# Patient Record
Sex: FEMALE | Employment: FULL TIME | URBAN - METROPOLITAN AREA
[De-identification: names, ages, dates, MRNs, and addresses within clinical notes are randomized per-mention and may not be internally consistent; named-entity substitution may affect disease eponyms.]

---

## 2022-10-07 ENCOUNTER — HOSPITAL ENCOUNTER (INPATIENT)
Age: 32
LOS: 7 days | Discharge: HOME | DRG: 949 | End: 2022-10-14
Attending: PHYSICAL MEDICINE & REHABILITATION | Admitting: PHYSICAL MEDICINE & REHABILITATION
Payer: COMMERCIAL

## 2022-10-07 DIAGNOSIS — I60.9 SAH (SUBARACHNOID HEMORRHAGE) (HCC): Primary | ICD-10-CM

## 2022-10-07 LAB
ALBUMIN SERPL-MCNC: 4.2 G/DL (ref 3.4–5)
ANION GAP SERPL CALCULATED.3IONS-SCNC: 12 MMOL/L (ref 3–16)
BUN BLDV-MCNC: 7 MG/DL (ref 7–20)
CALCIUM SERPL-MCNC: 9.1 MG/DL (ref 8.3–10.6)
CHLORIDE BLD-SCNC: 97 MMOL/L (ref 99–110)
CO2: 25 MMOL/L (ref 21–32)
CREAT SERPL-MCNC: <0.5 MG/DL (ref 0.6–1.1)
GFR AFRICAN AMERICAN: >60
GFR NON-AFRICAN AMERICAN: >60
GLUCOSE BLD-MCNC: 102 MG/DL (ref 70–99)
GLUCOSE BLD-MCNC: 104 MG/DL (ref 70–99)
GLUCOSE BLD-MCNC: 104 MG/DL (ref 70–99)
GLUCOSE BLD-MCNC: 105 MG/DL (ref 70–99)
PERFORMED ON: ABNORMAL
PHOSPHORUS: 3 MG/DL (ref 2.5–4.9)
POTASSIUM SERPL-SCNC: 3.8 MMOL/L (ref 3.5–5.1)
SODIUM BLD-SCNC: 134 MMOL/L (ref 136–145)

## 2022-10-07 PROCEDURE — 80069 RENAL FUNCTION PANEL: CPT

## 2022-10-07 PROCEDURE — 6370000000 HC RX 637 (ALT 250 FOR IP): Performed by: PHYSICAL MEDICINE & REHABILITATION

## 2022-10-07 PROCEDURE — 1280000000 HC REHAB R&B

## 2022-10-07 PROCEDURE — 94150 VITAL CAPACITY TEST: CPT

## 2022-10-07 PROCEDURE — 94640 AIRWAY INHALATION TREATMENT: CPT

## 2022-10-07 PROCEDURE — 6360000002 HC RX W HCPCS: Performed by: PHYSICAL MEDICINE & REHABILITATION

## 2022-10-07 PROCEDURE — 99222 1ST HOSP IP/OBS MODERATE 55: CPT | Performed by: PHYSICAL MEDICINE & REHABILITATION

## 2022-10-07 PROCEDURE — 36415 COLL VENOUS BLD VENIPUNCTURE: CPT

## 2022-10-07 RX ORDER — LOSARTAN POTASSIUM 25 MG/1
25 TABLET ORAL DAILY
Status: DISCONTINUED | OUTPATIENT
Start: 2022-10-07 | End: 2022-10-07

## 2022-10-07 RX ORDER — DEXTROSE MONOHYDRATE 100 MG/ML
INJECTION, SOLUTION INTRAVENOUS CONTINUOUS PRN
Status: DISCONTINUED | OUTPATIENT
Start: 2022-10-07 | End: 2022-10-14 | Stop reason: HOSPADM

## 2022-10-07 RX ORDER — PANTOPRAZOLE SODIUM 40 MG/1
40 TABLET, DELAYED RELEASE ORAL
Status: DISCONTINUED | OUTPATIENT
Start: 2022-10-08 | End: 2022-10-14 | Stop reason: HOSPADM

## 2022-10-07 RX ORDER — POLYETHYLENE GLYCOL 3350 17 G/17G
17 POWDER, FOR SOLUTION ORAL DAILY PRN
Status: DISCONTINUED | OUTPATIENT
Start: 2022-10-07 | End: 2022-10-14 | Stop reason: HOSPADM

## 2022-10-07 RX ORDER — OXYCODONE HYDROCHLORIDE 5 MG/1
5 TABLET ORAL EVERY 4 HOURS PRN
Status: DISCONTINUED | OUTPATIENT
Start: 2022-10-07 | End: 2022-10-08

## 2022-10-07 RX ORDER — INSULIN LISPRO 100 [IU]/ML
0-4 INJECTION, SOLUTION INTRAVENOUS; SUBCUTANEOUS NIGHTLY
Status: DISCONTINUED | OUTPATIENT
Start: 2022-10-07 | End: 2022-10-09

## 2022-10-07 RX ORDER — LANOLIN ALCOHOL/MO/W.PET/CERES
400 CREAM (GRAM) TOPICAL DAILY
Status: DISCONTINUED | OUTPATIENT
Start: 2022-10-07 | End: 2022-10-14 | Stop reason: HOSPADM

## 2022-10-07 RX ORDER — ACETAMINOPHEN 325 MG/1
650 TABLET ORAL EVERY 4 HOURS PRN
Status: DISCONTINUED | OUTPATIENT
Start: 2022-10-07 | End: 2022-10-08

## 2022-10-07 RX ORDER — ONDANSETRON 4 MG/1
4 TABLET, ORALLY DISINTEGRATING ORAL EVERY 8 HOURS PRN
Status: DISCONTINUED | OUTPATIENT
Start: 2022-10-07 | End: 2022-10-14 | Stop reason: HOSPADM

## 2022-10-07 RX ORDER — ENOXAPARIN SODIUM 150 MG/ML
1 INJECTION SUBCUTANEOUS 2 TIMES DAILY
Status: DISCONTINUED | OUTPATIENT
Start: 2022-10-07 | End: 2022-10-14 | Stop reason: HOSPADM

## 2022-10-07 RX ORDER — INSULIN LISPRO 100 [IU]/ML
0-8 INJECTION, SOLUTION INTRAVENOUS; SUBCUTANEOUS
Status: DISCONTINUED | OUTPATIENT
Start: 2022-10-07 | End: 2022-10-09

## 2022-10-07 RX ORDER — LEVETIRACETAM 500 MG/1
1000 TABLET ORAL 2 TIMES DAILY
Status: DISCONTINUED | OUTPATIENT
Start: 2022-10-07 | End: 2022-10-11

## 2022-10-07 RX ORDER — CIPROFLOXACIN HYDROCHLORIDE 3.5 MG/ML
4 SOLUTION/ DROPS TOPICAL 2 TIMES DAILY
Status: DISPENSED | OUTPATIENT
Start: 2022-10-07 | End: 2022-10-09

## 2022-10-07 RX ORDER — SERTRALINE HYDROCHLORIDE 25 MG/1
25 TABLET, FILM COATED ORAL DAILY
Status: DISCONTINUED | OUTPATIENT
Start: 2022-10-07 | End: 2022-10-14 | Stop reason: HOSPADM

## 2022-10-07 RX ADMIN — CIPROFLOXACIN 4 DROP: 3 SOLUTION OPHTHALMIC at 22:49

## 2022-10-07 RX ADMIN — METFORMIN HYDROCHLORIDE 500 MG: 500 TABLET ORAL at 18:29

## 2022-10-07 RX ADMIN — ENOXAPARIN SODIUM 120 MG: 150 INJECTION SUBCUTANEOUS at 22:08

## 2022-10-07 RX ADMIN — LEVETIRACETAM 1000 MG: 500 TABLET, FILM COATED ORAL at 22:08

## 2022-10-07 RX ADMIN — OXYCODONE 5 MG: 5 TABLET ORAL at 13:55

## 2022-10-07 RX ADMIN — OXYCODONE 5 MG: 5 TABLET ORAL at 22:29

## 2022-10-07 RX ADMIN — OXYCODONE 5 MG: 5 TABLET ORAL at 18:11

## 2022-10-07 ASSESSMENT — PAIN DESCRIPTION - DESCRIPTORS
DESCRIPTORS: ACHING
DESCRIPTORS: ACHING
DESCRIPTORS: ACHING;JABBING

## 2022-10-07 ASSESSMENT — PAIN DESCRIPTION - LOCATION
LOCATION: HEAD;OTHER (COMMENT)
LOCATION: HEAD
LOCATION: HEAD;OTHER (COMMENT)

## 2022-10-07 ASSESSMENT — PAIN DESCRIPTION - PAIN TYPE: TYPE: ACUTE PAIN

## 2022-10-07 ASSESSMENT — PAIN - FUNCTIONAL ASSESSMENT
PAIN_FUNCTIONAL_ASSESSMENT: PREVENTS OR INTERFERES SOME ACTIVE ACTIVITIES AND ADLS

## 2022-10-07 ASSESSMENT — PAIN SCALES - GENERAL
PAINLEVEL_OUTOF10: 6
PAINLEVEL_OUTOF10: 7
PAINLEVEL_OUTOF10: 7
PAINLEVEL_OUTOF10: 8
PAINLEVEL_OUTOF10: 8

## 2022-10-07 ASSESSMENT — PAIN DESCRIPTION - ORIENTATION
ORIENTATION: RIGHT;LEFT

## 2022-10-07 ASSESSMENT — PAIN DESCRIPTION - ONSET: ONSET: ON-GOING

## 2022-10-07 ASSESSMENT — PAIN DESCRIPTION - FREQUENCY: FREQUENCY: CONTINUOUS

## 2022-10-07 NOTE — PROGRESS NOTES
Patient admitted to unit, alert and oriented X4, sleepy, has been in pain. Oxycodone given as ordered. Breast pump supplied, pumped breast milk at 1500. Mother and mother in law came to visit. Assessment done as charted. Dr. Shaneka Perales contacted about BP, ordered cozaar 25mg PO QD as taken previous at home.

## 2022-10-07 NOTE — PROGRESS NOTES
H and P  Pt is a 34year old female with a PMH of MDD, ALMAS, and ADHD who presents to the ED via EMS after sustaining a 10 foot fall from her attic to her garage concrete floor. Patient had hit her head and there was blood present on the concrete floor. She was initially a GCS of 15 per EMS but deteriorated to GCS 10 en route and started vomiting. She was given hypertonic saline. In the ED, patient was confused but able to answer questions. Presented as a GCS 15. CT of the head revealed multifocal hemorrhagic contusions within the inferomedial frontal lobes, greater on the left. 2.  Scattered subarachnoid hemorrhage. 3.  Nondisplaced left mastoid temporal bone fracture with middle ear and mastoid effusion. Pt did not require neurosurgical intervention during hospital stay. Pt is now medically stable and ready for discharge. Assessment and Plan   Neuro:  Multifocal hemorrhagic contusions   Scattered SAH   DVST   Superior enplate deformities of T11 & T12,indeterminat   -Continue neuro checks   -No neurosurgical intervention indicated at this time   -Keppra x 7 days for seizure prophylaxis   -Upright x-rays reviewed, no brace needed for T11/T12 SEP deformities;activity as tolerated   -Sodium goal- normonatremia    -Heparin gtt for DVST -HCT when therapeutic   -CTA in 7 days (10/8)  -PT/OT-IPR  -Pain management: PRN Tylenol, will add low dose Oxycodone      HEENT   #L mastoid temporal bone fracture with middle ear and mastoid effusion  #Diastases of the left occipital mastoid suture.  Fracture involving the left sigmoid plate with pneumocephalus.   - ENT consulted  - CT max/face 10/3 with L occipital mastoid suture diastases and L sigmoid plate fracture with pneumocephalus   - Gelfoam (dissolves, not requiring removal) placed in L EAC, please apply Ciprodex 4 drops BID x 7 days    - Outpatient audiogram in 1-2 weeks with ENT follow up                   Pulm:  -EZ pap/IS                    CVS:   HTN   -No acute issues                    Endocrine:  Hx of Diabetes (Metformin at home)    -No acute issues                    F/E/Renal: No acute issues                    Nutrition / GI:  -Bowel regimen ( 10/5)  -Diet: regular diet   -GI Prophylaxis: N/A                    Heme:   DVST  partially occlusive thrombus within left sigmoid and transverse sinuses   -Lovenox       Anticouagulation   -Lovenox    Diet  -Regular/Thin    Erich Maxwell MOT, OTR/L  Clinical Liaison- [facility]   (P): [phone]  (F): [phone]

## 2022-10-07 NOTE — PROGRESS NOTES
Admission done as charted. Patient in pain headache, eyes closed but responds to questions, oriented X4. Oxycodone given when approved and wristband available to scan. Mother at bedside. Nursing attempting to locate a pump for client to use. 4 eyes assessment done as charted with Magui SIMPSON

## 2022-10-07 NOTE — H&P
Department of Physical Medicine & Rehabilitation  History & Physical      Patient Identification:  Catracho Flores  : 1990  Admit date: (Not on file)   Attending provider: Scott Burns DO        Primary care provider: No primary care provider on file. Chief Complaint: SAH    History of Present Illness/Hospital Course:  Pt is a 34year old female with a PMH of MDD, ALMAS, and ADHD who presents to the ED via EMS after sustaining a 10 foot fall from her attic to her garage concrete floor. Patient had hit her head and there was blood present on the concrete floor. She was initially a GCS of 15 per EMS but deteriorated to GCS 10 en route and started vomiting. She was given hypertonic saline. In the ED, patient was confused but able to answer questions. Presented as a GCS 15. CT of the head revealed multifocal hemorrhagic contusions within the inferomedial frontal lobes, greater on the left. 2.  Scattered subarachnoid hemorrhage. 3.  Nondisplaced left mastoid temporal bone fracture with middle ear and mastoid effusion. Pt did not require neurosurgical intervention during hospital stay. Pt is now medically stable and ready for discharge. Prior Level of Function:  Independent for mobility, ADLs, and IADLs    Current Level of Function:  Mod assist     Pertinent Social History:  Support: family at home  Home set-up: 1 level     No past medical history on file. Fall in past year: yes    No past surgical history on file. Major Surgery in past 100 days: No    No family history on file.     Social History     Socioeconomic History    Marital status:        Not on File      Current Facility-Administered Medications   Medication Dose Route Frequency Provider Last Rate Last Admin    ciprofloxacin (CILOXAN) 0.3 % ophthalmic solution 4 drop  4 drop Left Ear BID Augustus Josue DO        enoxaparin (LOVENOX) injection 120 mg  1 mg/kg (Order-Specific) SubCUTAneous BID Augustus Josue DO        insulin lispro (1 Unit Dial) (HUMALOG/ADMELOG) pen 0-8 Units  0-8 Units SubCUTAneous TID  Augustus L Heis, DO        insulin lispro (1 Unit Dial) (HUMALOG/ADMELOG) pen 0-4 Units  0-4 Units SubCUTAneous Nightly Augustus L Heis, DO        levETIRAcetam (KEPPRA) tablet 1,000 mg  1,000 mg Oral BID Augustus L Heis, DO        magnesium oxide (MAG-OX) tablet 400 mg  400 mg Oral Daily Augustus L Heis, DO        ondansetron (ZOFRAN-ODT) disintegrating tablet 4 mg  4 mg Oral Q8H PRN Augustus L Heis, DO        oxyCODONE (ROXICODONE) immediate release tablet 5 mg  5 mg Oral Q4H PRN Augustus L Heis, DO        sertraline (ZOLOFT) tablet 25 mg  25 mg Oral Daily Augustus L Heis, DO        metFORMIN (GLUCOPHAGE) tablet 500 mg  500 mg Oral BID  Augustus L Chandanais, DO        acetaminophen (TYLENOL) tablet 650 mg  650 mg Oral Q4H PRN Augustus L Heis, DO        bisacodyl (DULCOLAX) EC tablet 5 mg  5 mg Oral Daily Augustus L Heis, DO        magnesium hydroxide (MILK OF MAGNESIA) 400 MG/5ML suspension 30 mL  30 mL Oral Daily PRN Augustus L Heis, DO        polyethylene glycol (GLYCOLAX) packet 17 g  17 g Oral Daily PRN Augustus L Heis, DO        glucose chewable tablet 16 g  4 tablet Oral PRN Augustus L Heis, DO        dextrose bolus 10% 125 mL  125 mL IntraVENous PRN Augustus L Heis, DO        Or    dextrose bolus 10% 250 mL  250 mL IntraVENous PRN Augustus L Heis, DO        glucagon (rDNA) injection 1 mg  1 mg SubCUTAneous PRN Augustus L Heis, DO        dextrose 10 % infusion   IntraVENous Continuous PRN Augustus L Heis, DO         No current outpatient medications on file. REVIEW OF SYSTEMS:   CONSTITUTIONAL: negative for fevers, chills, diaphoresis, appetite change, night sweats, unexpected weight change, postiive for +fatigue. EYES: negative for blurred vision, eye discharge, visual disturbance and icterus. HEENT: negative for hearing loss, tinnitus, ear drainage, sinus pressure, nasal congestion, epistaxis and snoring.    RESPIRATORY: Negative for hemoptysis, cough, sputum production. CARDIOVASCULAR: negative for chest pain, palpitations, exertional chest pressure/discomfort, syncope, edema   GASTROINTESTINAL: negative for nausea, vomiting, diarrhea, blood in stool, abdominal pain, constipation. GENITOURINARY: negative for frequency, dysuria, urinary incontinence, decreased urine volume, and hematuria. HEMATOLOGIC/LYMPHATIC: negative for easy bruising, bleeding and lymphadenopathy. ALLERGIC/IMMUNOLOGIC: negative for recurrent infections, angioedema, anaphylaxis and drug reactions. ENDOCRINE: negative for weight changes and diabetic symptoms including polyuria, polydipsia and polyphagia. MUSCULOSKELETAL: negative for pain, joint swelling, decreased range of motion. NEUROLOGICAL: positive for headaches, negative for slurred speech, unilateral weakness. PSYCHIATRIC/BEHAVIORAL: negative for hallucinations, behavioral problems, confusion and agitation. All pertinent positives are noted in the HPI. Physical Examination:  Vitals: No data found. Const: Alert. WDWN. No distress, obese   Eyes: Conjunctiva noninjected, no icterus noted; pupils equal, round, and reactive to light. HENT: Atraumatic, normocephalic; Oral mucosa moist  Neck: Trachea midline, neck supple. No thyromegaly noted. CV: Regular rate and rhythm, no murmur rub or gallop noted  Resp: Lungs clear to auscultation bilaterally, no rales wheezes or ronchi, no retractions. Respirations unlabored. GI: Soft, nontender, nondistended. Normal bowel sounds. No palpable masses. Skin: Normal temperature and turgor. No rashes or breakdown noted. Ext: slight LE edema appreciated. No varicosities. MSK: No joint tenderness, erythema, warmth noted. AROM intact. Neuro:   -Mental status: Alert. Oriented to person, place, time, situation. 3 word immediate and delayed recall (sock, bed, blue) intact. Attention intact (months of year in reverse).    -Language: Speech fluent, repetition and naming intact  -Cranial nerves: VFF, PERRL, EOMI, Facial sensation intact, Face symmetric, Hearing intact, Palate elevation symmetric, Shoulder shrug intact. Tongue midline.   -Sensation intact to light touch. -Motor examination reveals 4/5 strength in all four limbs diffusely. Psych: Stable mood, normal judgement, normal affect     No results found for: WBC, HGB, HCT, MCV, PLT  No results found for: INR, PROTIME  No results found for: CREATININE, BUN, NA, K, CL, CO2  No results found for: ALT, AST, GGT, ALKPHOS, BILITOT      No orders to display         The above laboratory data have been reviewed. The above imaging data have been reviewed. The above medical testing have been reviewed. There is no height or weight on file to calculate BMI. POST ADMISSION PHYSICIAN EVALUATION  The patient has agreed to being admitted to our comprehensive inpatient rehabilitation facility and can tolerate the intensity of service consisting of at least:  --180 minutes of therapy a day, 5 out of 7 days a week. OR  --15 hours of intensive therapy within a 7 consecutive day period. The patient/family has a good understanding of our discharge process and will benefit from an interdisciplinary inpatient rehabilitation program. The patient has potential to make improvement and is in need of at least two of the following multidisciplinary therapies including but not limited to physical, occupational, respiratory, and speech, nutritional services, wound care, and prosthetics and orthotics. Given the patients complex condition and risk of further medical complications, rehabilitation services cannot be safely provided at a lower level of care such as a skilled nursing facility. All of the goals listed below were reviewed with the patient and he/she is in agreement.     I have compared the patients medical and functional status at the time of the preadmission screening and the same on this date, and there are no significant changes. By signing this document, I acknowledge that I have personally performed a full physical examination on this patient within 24 hours of admission to this inpatient rehabilitation facility and have determined the patient to be able to tolerate the above course of treatment at an intensive level for a reasonable period of time. I will be completing a detailed individualized  Plan of Care for this patient by day four of the patients stay based upon the Preadmission Screen, this Post-Admission Evaluation, and the therapy evaluations. Barriers: Decreased functional mobility, medical comorbidities  Services Required: PT, OT, SLP  Goals: mod I  Prognosis: Good  Anticipated Dispo: home  ELOS: TBD    Rehabilitation Diagnosis:   2.22, Traumatic, closed injury      Assessment and Plan:  #Multifocal hemorrhagic contusions   Scattered SAH   Superior enplate deformities of T11 & T12,indeterminat   -No neurosurgical intervention indicated at this time   -Keppra x 7 days for seizure prophylaxis   -Upright x-rays reviewed, no brace needed for T11/T12 SEP deformities;activity as tolerated   -Heparin gtt for DVST -HCT when therapeutic   -CTA outpatient  -PT/OT/SLP  -Pain management: PRN Tylenol, low dose Oxycodone     #L mastoid temporal bone fracture with middle ear and mastoid effusion  #Diastases of the left occipital mastoid suture.  Fracture involving the left sigmoid plate with pneumocephalus.   - CT max/face 10/3 with L occipital mastoid suture diastases and L sigmoid plate fracture with pneumocephalus   - Gelfoam (dissolves, not requiring removal) placed in L EAC, please apply Ciprodex 4 drops BID x 7 days - 2 days left   - Outpatient audiogram in 1-2 weeks with ENT follow up                                   # Hx of Diabetes (Metformin at home)    -No acute issues   - SSI  - metformin low dose                    # Obesity   -counseling                      #DVST    partially occlusive thrombus

## 2022-10-07 NOTE — PROGRESS NOTES
"  Refill request received from: pharmacy    Requested medication(s): methylphenidate (RITALIN LA) 20 mg 24 hr capsule and methylphenidate (RITALIN) 10 mg tablet    Last appointment: 4/28/2022    Any no showed/ canceled visits since last appointment? no    Recommended follow up timeframe from last visit: 3 months    Follow up appointment scheduled for: none scheduled at this time    Months of medication pended per [facility] refill protocol: 3    Request was sent to Dr. Pope for approval    If patient is due for follow up \"Appointment required for further refills [phone]\" was placed in the sig of the medication and encounter was routed to scheduling pool to encourage follow up.     Medication pended by: Cordelia Martínez CMA      " ARU Admission Assessment    Ethnicity  \"Are you of , /a, or [place]ian origin? \"  Check all that apply:  [x] A. No, not of , /a, or [address] Origin  [] B.  Yes, [place], [place] American, Chicano/a  [] C.  Yes, [address] [place] [address]  [] D.  Yes, [place]  [] E.  Yes, another , , or [place] origin  [] X. Patient unable to respond    Race  \"What is your race? \"  Check all that apply:   A. White  [] B. Black or   [] C. American [facility] or [place] Native  [] D.  [facility] [place] [facility]  [] E. [place]  [] F. Bhutanese  [] G. [place]  [] Lalo Age  [] I. [place]  [] J.  Other   [] K.   [] L. Danish or [place]  [] M. Kyrgyz  [] N. Other [place]  [] X. Patient unable to respond    Language  A. \"What is your preferred language? \"   English    B. \"Do you need or want an  to communicate with a doctor or health care staff? \"  Check only one:  [x] 0. No  [] 1. Yes  [] 9. Unable to determine    Transportation  \"Has lack of transportation kept you from medical appointments, meetings, work, or from getting things needed for daily living? \"Check all that apply:  [] A.  Yes, it has kept me from medical appointments or from getting my medications  [] B.  Yes, it has kept me from non-medical meetings, appointments, work, or from getting things that I need  [x] C.  No  [] X. Patient unable to respond    Hearing  Ability to hear (with hearing aid or hearing appliances if normally used)  []  0. Adequate - no difficulty in normal conversation, social interaction, listening to TV  [x]  1. Minimal difficulty - difficulty in some environments (e.g. when person speaks softly or setting is noisy)  []  2. Moderate difficulty - speaker has to increase volume and speak distinctly   []  3. Highly impaired - absence of useful hearing    Vision  Ability to see in adequate light (with glasses or other visual appliances)  [x]  0.   Adequate - sees fine detail, such as regular print in newspapers/books  []  1. Impaired - sees large print, but not regular print in newspapers/books  []  2. Moderately impaired - limited vision; not able to see newspaper headlines but can identify objects  []  3. Highly impaired - object identification in question, but eyes appear to follow objects  []  4. Severely impaired - no vision or sees only light, colors, or shapes; eyes do not appear to follow objects    Health Literacy  \"How often do you need to have someone help you when you read instructions, pamphlets, or other written material from your doctor or pharmacy? \"  []  0. Never  []  1. Rarely  []  2. Sometimes  []  3. Often  []  4. Always  [x]  8. Patient unable to respond    BIMS - **Must be completed in the flowsheet at admission prior to proceeding with Delirium Assessment**  [x] BIMS completed in flowsheet at admission    Signs and Symptoms of Delirium  A. Acute Onset Mental Status Change - Is there evidence of an acute change in mental status from the patient's baseline?  [] 0. No  [x] 1.  Yes - lethargic    B. Inattention - Did the patient have difficulty focusing attention, for example being easily distractible or having difficulty keeping track of what was being said?  []  0. Behavior not present  []  1. Behavior continuously present, does not fluctuate  [x]  2. Behavior present, fluctuates (comes and goes, changes in severity)    C. Disorganized thinking - Was the patient's thinking disorganized or incoherent (rambling or irrelevant conversation, unclear or illogical flow of ideas, or unpredictable switching from subject to subject)? [x]  0. Behavior not present  []  1. Behavior continuously present, does not fluctuate  []  2. Behavior present, fluctuates (comes and goes, changes in severity)    D. Altered level of consciousness - Did the patient have altered level of consciousness as indicated by any of the following criteria?   Vigilant - startled easily to any sound or touch  Lethargic - repeatedly dozed off while being asked questions, but responded to voice or touch  Stuporous - very difficulty to arouse and keep aroused for the interview  Comatose - could not be aroused  []  0. Behavior not present  [x]  1. Behavior continuously present, does not fluctuate - Lethargic  []  2. Behavior present, fluctuates (comes and goes, changes in severity)    Mood    \"Over the last 2 weeks, have you been bothered by any of the following problems?\" 1. Symptom Presence    0 = No  1 = Yes  9 = No Response 2. Symptom Frequency    0 = Never or 1 day  1 = 2-6 days (several days)  2 = 7-11 days (half or more of the days)  3 = 12-14 days (nearly every day)  **Leave blank if 'No Reponse'**      Enter scores in boxes    Column 1 Column 2   Little interest or pleasure in doing things   1 1   Feeling down, depressed, or hopeless   1 3   **If either A or B in column 2 is coded 2 or 3, CONTINUE asking the questions below. If not, END the interview. **     Trouble falling or staying asleep, or sleeping too much   0 3   Feeling tired or having little energy   1 3   Poor appetite or overeating   1 1   Feeling bad about yourself - or that you are a failure or have let yourself or your family down   1 3   Trouble concentrating on things, such as reading the newspaper or watching television   1 3   Moving or speaking so slowly that other people could have noticed. Or the opposite- being so fidgety or restless that you have been moving around a lot more than usual.   1 1   Thoughts that you would be better off dead, or of hurting yourself in some way. 0 0   Total Severity: Add scores for all frequency responses in column 2 (possible score 0-27, or enter 99 if unable to complete (if symptom frequency (column 2) is blank for 3 or more items). 18     Social Isolation  \"How often do you feel lonely or isolated from those around you? \"  [x] 0. Never  [] 1. Rarely  [] 2. Sometimes  [] 3. Often  [] 4. Always  [] 7. Patient declines to respond  [] 8. Patient unable to respond    Pain Effect on Sleep  \"Over the past 5 days, how much of the time has pain made it hard for you to sleep at night? \"  []  0. Does not apply - I have not had any pain or hurting in the past 5 days  [x]  1. Rarely or not at all  []  2. Occasionally  []  3. Frequently  []  4. Almost constantly  []  8. Unable to answer    **If the patient answers \"0. Does not apply\" to this question, skip the next two \"Pain Effect. Joao Mering Joao Mering \" questions**    Pain Interference with Therapy Activities  \"Over the past 5 days, how often have you limited your participation in rehabilitation therapy sessions due to pain? \"  [x]  0. Does not apply - I have not received rehabilitation therapy in the past 5 days  []  1. Rarely or not at all  []  2. Occasionally  []  3. Frequently  []  4. Almost constantly  []  8. Unable to answer    Pain Interference with Day-to-Day Activities: \"Over the past 5 days, how often have you limited your day-to-day activities (excluding rehabilitation therapy session)? \"  []  1. Rarely or not at all  []  2. Occasionally  []  3. Frequently  []  4. Almost constantly  [x]  8. Unable to answer    Nutritional Approaches  Check all of the following nutritional approaches that apply on admission:  []  A. Parenteral/IV feeding (including IV fluids if needed for hydration, but not as part of dialysis/chemo)  []  B. Feeding tube (e.g., nasogastric or abdominal (PEG))  []  C. Mechanically altered diet - requires change in texture of food or liquids (e.g., pureed food, thickened liquids)  []  D. Therapeutic diet (e.g., low salt, diabetic, low cholesterol)  [x]  Z. None of the above    High Risk Drug Classes:  Use and Indication    Is taking: Check if the pt is taking any medications by pharmacological classification, not how it is used, in the following classes  Indication noted:  If column 1 is checked, check if there is an indication noted for all meds in the drug class Is taking  (check all that apply) Indication noted (check all that apply)   Antipsychotic [] []   Anticoagulant [x] []   Antibiotic [] []   Opioid [x] []   Antiplatelet [] []   Hypoglycemic (including insulin) [x] []   None of the above []     Special Treatments, Procedures, and Programs    Check all of the following treatments, procedures, and programs that apply on admission. On admission (check all that apply)   Cancer Treatments   A1. Chemotherapy []           A2. IV []           A3. Oral []           A10. Other []   B1. Radiation []   Respiratory Therapies   C1. Oxygen Therapy []           C2. Continuous (continuously for at least 14 hours per day) []           C3. Intermittent []           C4. High-concentration []   D1. Suctioning (Does not include oral suctioning) []           D2. Scheduled []           D3. As needed []   E1. Tracheostomy Care []   F1. Invasive Mechanical Ventilator (ventilator or respirator) []   G1. Non-invasive Mechanical Ventilator []           G2. BiPAP []           G3. CPAP []   Other   H1. IV Medications (Do not include sub Q pumps, flushes, Dextrose 50% or lactated ringers) []           H2. Vasoactive medications []           H3. Antibiotics []           H4. Anticoagulation [x]           H10. Other []   I1. Transfusions []   J1. Dialysis []           J2. Hemodialysis []           J3. Peritoneal dialysis []   O1. IV access (including a catheter in a vein) []           O2. Peripheral []           O3. Midline []           O4. Central (PICC, tunneled, port) []      None of the above (select if no Cancer, Respiratory, or Other boxes are checked) []     The above items have been reviewed and updated as necessary, and are accurate for the admission assessment period.     Reviewing RN:   Aguilar Jackson RN

## 2022-10-07 NOTE — PROGRESS NOTES
10/07/22 1522   Encounter Summary   Encounter Overview/Reason  Initial Encounter   Service Provided For: Patient and family together   Referral/Consult From: Nurse   Support System Spouse;Parent   Last Encounter    (es 10/7)   Complexity of Encounter Moderate   Begin Time 1455   End Time  1520   Total Time Calculated 25 min   Assessment/Intervention/Outcome   Assessment Anxious; Fearful   Intervention Active listening;Discussed belief system/Episcopalian practices/krystal;Discussed illness injury and its impact; Explored Coping Skills/Resources;Prayer (assurance of)/[place]   Outcome Receptive;Engaged in conversation   Plan and Referrals   Plan/Referrals Other (Comment)  (as needed)

## 2022-10-07 NOTE — PLAN OF CARE
ARU PATIENT TREATMENT PLAN  The [address] [place] [address],[address]  [phone]    Bryan Iglesias    : 1990  Acct #: [[id number]]  MRN: [id number]  PHYSICIAN:  Wale Josue DO  Primary Problem    Patient Active Problem List   Diagnosis    SAH (subarachnoid hemorrhage) ([facility] [address]       Rehabilitation Diagnosis:  2.22, Traumatic, closed injury  ADMIT DATE:10/7/2022    Patient Goals: \"To get out of here \"  Admitting Impairments: Decreased functional mobility ; Decreased ADL status; Decreased endurance;Decreased high-level IADLs;Decreased cognition;Decreased safe awareness  Activity Restrictions: None  Participation Limitations: None   CARE PLAN   NURSING:  Bryan Harris while on this unit will:  [x] Be continent of bowel and bladder     [x] Have an adequate number of bowel movements  [] Urinate with no urinary retention >300ml in bladder  [] Complete bladder protocol with rodrigues removal  [x] Maintain O2 SATs at ___%  [x] Have pain managed while on ARU       [] Be pain free by discharge   [x] Have no skin breakdown while on ARU  [] Have improved skin integrity via wound measurements  [] Have no signs/symptoms of infection at the wound site  [x] Be free from injury during hospitalization   [] Complete education with patient/family with understanding demonstrated for:  [] Adjustment   [] Other:     Nursing Interventions will include:  [] bowel/bladder training   [x] education for medical assistive devices   [x] medication education   [] O2 saturation management   [] energy conservation   [x] stress management techniques   [x] fall prevention   [x] alarms protocol   [x] seating and positioning   [x] skin/wound care   [x] pressure relief instruction   [] dressing changes     [] infection protection   [x] DVT prophylaxis  [x] assistance with in room safety with transfers to bed, toilet, wheelchair, shower   [x] bathroom activities and hygiene  [] Other:    Patient/Caregiver Education for:  [x] Disease/sustained injury/management     [x] Medication Use  [] Surgical intervention  [x] Safety  [x] Body mechanics and or joint protection  [x] Health maintenance     [] Other:     PHYSICAL THERAPY:  Goals:                  Short Term Goals  Time Frame for Short Term Goals: 2 weeks  Short Term Goal 1: Ind with bed mobility  Short Term Goal 2: Ind with transf excluding floor transf  Short Term Goal 3: Ind with amb on level surfaces distances > 250' at a time  Short Term Goal 4: MI with amb up and down 12 steps ( used as an endurnace goal)  Additional Goals?: No            Long Term Goals  Time Frame for Long Term Goals : STG=LTG  These goals were reviewed with this patient at the time of assessment and Emir Penaloza is in agreement.      Plan of Care: Pt to be seen 5 out of 7 days per week per ARU protocol (60 minutes with PT)                  Current Treatment Recommendations: Balance training, Strengthening, Functional mobility training, Transfer training, Endurance training, Gait training, Stair training, Neuromuscular re-education, Safety education & training, Patient/Caregiver education & training, Equipment evaluation, education, & procurement, Therapeutic activities    OCCUPATIONAL THERAPY:  Goals:             Short Term Goals  Time Frame for Short Term Goals: 2 weeks  Short Term Goal 1: Patient will complete LB dressing with MOD I  Short Term Goal 2: Patient will complete bathing with MOD I  Short Term Goal 3: Patient will complete UB dressing at independent level  Short Term Goal 4: Patient will complete functional transfers, including commode transfer and functional transfers, with MOD I  Short Term Goal 5: Patient will complete simple kitchen task with SPVN :    :  These goals were reviewed with this patient at the time of assessment and Emir Penaloza is in agreement    Plan of Care:  Pt to be seen 5 out of 7 days per week per ARU protocol (60 minutes with OT)       SPEECH THERAPY:   Goals:                                                         Short Term Goals  Goal 1: Pt will complete graded tasks with adequate executive function skills with 90% accuracy independently. Goal 2: Pt will initiate responses <5 seconds across 3 sessions. Goal 3: Pt will demonstrate planning and organization during graded tasks with 90% accuracy independently. Goal 4: Pt will tolerate ongoing cognitive-linguistic assessment as clinically indicated. Plan of Care:  Pt to be seen 5 out of 7 days per week per ARU protocol (60 minutes with SLP)    Therapy Treatments will include:  [x]  therapeutic exercises    [x]  gait training     [x]  neuromuscular re-ed                            [x]  transfer training             [] community reintegration    [x] bed mobility                          [x]  w/c mobility and training  [x]  self care    []home mgmt    [x]  cognitive training            [x]  energy conservation        []  dysphagia tx    []  speech/language/communication therapy   []  group therapy    [x]  patient/family education    [] Other:    CASE MANAGEMENT:  Goals: Assist patient/family with discharge planning, patient/family counseling, and coordination with insurance during ARU stay.     Admission Period/Goal QM SCORES  QM Admit/Goal Score   Eating CARE Score: 5 /     Oral Hygiene CARE Score: 80 / Discharge Goal: Independent   Shower/Bathing CARE Score: 1 / Discharge Goal: Independent   UB Dressing CARE Score: 4 / Discharge Goal: Independent   LB Dressing CARE Score: 88 / Discharge Goal: Independent   Putting on/off Footwear CARE Score: 1 / Discharge Goal: Independent   Toileting Hygiene CARE Score: 4 / Discharge Goal: Independent   Bladder Continence Bladder Continence: Always continent    Bowel Continence Bowel Continence: Always continent    Toilet Transfers CARE Score: 4 / Discharge Goal: Independent   Shower/Bathe Self  CARE Score: 1 / Discharge Goal: Independent   Rolling Left and Right CARE Score: 6 / Discharge Goal: Independent   Sit to Lying CARE Score: 4 / Discharge Goal: Independent   Lying to Sitting on Bedside CARE Score: 4 / Discharge Goal: Independent   Sit to Stand CARE Score: 4 / Discharge Goal: Independent   Chair/Bed to Chair Transfer CARE Score: 1 / Discharge Goal: Independent   Car Transfers CARE Score: 88 / Discharge Goal: Independent   Walk 10 Feet CARE Score: 1 / Discharge Goal: Independent   Walk 50 Feet with Two Turns CARE Score: 88 / Discharge Goal: Independent   Walk 150 Feet CARE Score: 88 / Discharge Goal: Independent   Walk 10 Feet on Uneven Surfaces CARE Score: 88 /     1 Step (Curb) CARE Score: 88 /     4 Steps CARE Score: 88 / Discharge Goal: Independent   12 Steps CARE Score: 88 / Discharge Goal: Independent   Picking up Object from Floor CARE Score: 88 / Discharge Goal: Independent   Wheel 50 Feet with 2 Turns   /     Type         [] Manual        [] Motorized        [] N/A   Wheel 150 Feet   /     Type         [] Manual        [] Motorized        [] N/A        Gayathri Quach will be seen a minimum of 3 hours of therapy per day, a minimum of 5 out of 7 days per week (please see above for specific treatment plan per PT/OT/SLP). [] In this rare instance due to the nature of this patient's medical involvement, this patient will be seen 15 hours per week (900 minutes within a 7day period). In addition, dietician/nutritionist may monitor calorie count as well as intake and collaboratively work with SLP on dietary upgrades. Neuropsychology/Psychology may evaluate and provide necessary support.     Medical issues being managed closely and that require 24hour availability of a physician:  [] Swallowing Precautions  [x] Bowel/Bladder Fx  [] Weight bearing precautions  [x] Wound Care    [x] Pain Mgmt   [] Infection Protection  [x] DVT Prophylaxis   [x] Fall Precautions  [x] Fluid/Electrolyte/Nutrition Balance  [] Voice Protection   [] Respiratory  [] Other:    Medical Prognosis: [x] Good  [] Fair    [] Guarded   Total expected IRF days 14  Anticipated discharge destination:   [] Home Independently   [x] Home Modified Independent  [] Home with supervision    []SNF     [] Other                                           Physician anticipated functional outcomes:Pt will progress to a level of MOD I for all functional mobility and ADLs to allow for a safe return home. IPOC brief synthesis: Pt is a 34year old female with a PMH of MDD, ALMAS, and ADHD who presents to the ED via EMS after sustaining a 10 foot fall from her attic to her garage concrete floor. Patient had hit her head and there was blood present on the concrete floor. She was initially a GCS of 15 per EMS but deteriorated to GCS 10 en route and started vomiting. She was given hypertonic saline. In the ED, patient was confused but able to answer questions. Presented as a GCS 15. CT of the head revealed multifocal hemorrhagic contusions within the inferomedial frontal lobes, greater on the left. 2.  Scattered subarachnoid hemorrhage. 3.  Nondisplaced left mastoid temporal bone fracture with middle ear and mastoid effusion. Pt did not require neurosurgical intervention during hospital stay. Pt is now medically stable and ready for discharge. This initial ARU patient treatment plan of care, together with the IPOC & the Education plan, form the foundation for the patient's plan of care. Weekly patient care conferences are held to evaluate progress towards the initial treatment plan & goals.     I have reviewed this initial plan of care and agree with its contents:    Title   Name    Date    Time    Physician: Ivet Jeffers D.O. M.P.H  PM&R  10/10/2022  11:19 AM      Case Mgmt: CHAPO Garcia 10/10/22 1056    OT:  Lio Lainez OTR/L #[id number] 10/8/2022 @ [phone]    PT: Jessi Landry LPT #[id number]  on 10/8/2022 @3103    RN: Rodrigo Menchaca RN    ST: Keenan Miller M.A., CCC-SLP [id number], 10/8/22, 1554    ARU Supervisor: Jennifer Pacheco PT, DPT 10/10/2022 @ 1021    Other:

## 2022-10-07 NOTE — PROGRESS NOTES
4 Eyes Admission Assessment     I agree as the admission nurse that 2 RN's have performed a thorough Head to Toe Skin Assessment on the patient. ALL assessment sites listed below have been assessed on admission. Areas assessed by both nurses: Hilaria Aldridge RN and [facility] RN   [x]   Head, Face, and Ears   [x]   Shoulders, Back, and Chest  [x]   Arms, Elbows, and Hands   [x]   Coccyx, Sacrum, and Ischium  [x]   Legs, Feet, and Heels        Does the Patient have Skin Breakdown? Pt has a large bruise behind her L ear and a small bruise to her lower back. She has scattered bruising to her BUE and to her abdomen. Pt also has scattered abrasions to her BUE. She has a laceration underneath her chin that has been dermabounded close. +_Add on: Night shift informed day shift RN of laceration to posterior head due to seeing blood on pillow. Was not seen on admit due to hair being knotted from laying in bed. On examination, patient has a dime size laceration to posterior head with some bleeding. Patient believes its from her fall at home. Possible scab that opened up. Wound consult placed.  Roni Course Diver RN        Isaias Prevention initiated:  NA   Wound Care Orders initiated:  NA      [facility] nurse consulted for Pressure Injury (Stage 3,4, Unstageable, DTI, NWPT, and Complex wounds) or Isaias score 18 or lower:  NA      Nurse 1 eSignature: Electronically signed by Walter Sampson RN on 10/7/22 at 2:12 PM EDT    **SHARE this note so that the co-signing nurse is able to place an eSignature**    Nurse 2 eSignature: Electronically signed by Cecilio Solano RN on 10/7/22 at 2:31 PM EDT

## 2022-10-08 ENCOUNTER — APPOINTMENT (OUTPATIENT)
Dept: CT IMAGING | Age: 32
DRG: 949 | End: 2022-10-08
Attending: PHYSICAL MEDICINE & REHABILITATION
Payer: COMMERCIAL

## 2022-10-08 LAB
GLUCOSE BLD-MCNC: 106 MG/DL (ref 70–99)
GLUCOSE BLD-MCNC: 109 MG/DL (ref 70–99)
GLUCOSE BLD-MCNC: 110 MG/DL (ref 70–99)
GLUCOSE BLD-MCNC: 118 MG/DL (ref 70–99)
PERFORMED ON: ABNORMAL

## 2022-10-08 PROCEDURE — 97166 OT EVAL MOD COMPLEX 45 MIN: CPT

## 2022-10-08 PROCEDURE — 70450 CT HEAD/BRAIN W/O DYE: CPT

## 2022-10-08 PROCEDURE — 1280000000 HC REHAB R&B

## 2022-10-08 PROCEDURE — 6370000000 HC RX 637 (ALT 250 FOR IP): Performed by: PHYSICAL MEDICINE & REHABILITATION

## 2022-10-08 PROCEDURE — 6360000002 HC RX W HCPCS: Performed by: PHYSICAL MEDICINE & REHABILITATION

## 2022-10-08 PROCEDURE — 97162 PT EVAL MOD COMPLEX 30 MIN: CPT | Performed by: PHYSICAL THERAPIST

## 2022-10-08 PROCEDURE — 97535 SELF CARE MNGMENT TRAINING: CPT

## 2022-10-08 PROCEDURE — 92523 SPEECH SOUND LANG COMPREHEN: CPT

## 2022-10-08 PROCEDURE — 97116 GAIT TRAINING THERAPY: CPT | Performed by: PHYSICAL THERAPIST

## 2022-10-08 PROCEDURE — 97530 THERAPEUTIC ACTIVITIES: CPT | Performed by: PHYSICAL THERAPIST

## 2022-10-08 RX ORDER — OXYCODONE HYDROCHLORIDE 15 MG/1
7.5 TABLET ORAL EVERY 4 HOURS PRN
Status: DISCONTINUED | OUTPATIENT
Start: 2022-10-08 | End: 2022-10-14 | Stop reason: HOSPADM

## 2022-10-08 RX ORDER — ACETAMINOPHEN 325 MG/1
650 TABLET ORAL EVERY 4 HOURS PRN
Status: DISCONTINUED | OUTPATIENT
Start: 2022-10-08 | End: 2022-10-14 | Stop reason: HOSPADM

## 2022-10-08 RX ORDER — LOSARTAN POTASSIUM 25 MG/1
25 TABLET ORAL DAILY
Status: ON HOLD | COMMUNITY
Start: 2022-07-14 | End: 2022-10-14 | Stop reason: HOSPADM

## 2022-10-08 RX ORDER — LORATADINE 10 MG/1
10 TABLET ORAL DAILY
COMMUNITY
Start: 2022-03-10

## 2022-10-08 RX ORDER — SERTRALINE HYDROCHLORIDE 25 MG/1
TABLET, FILM COATED ORAL
COMMUNITY
Start: 2022-07-14

## 2022-10-08 RX ADMIN — LEVETIRACETAM 1000 MG: 500 TABLET, FILM COATED ORAL at 22:15

## 2022-10-08 RX ADMIN — METFORMIN HYDROCHLORIDE 500 MG: 500 TABLET ORAL at 08:16

## 2022-10-08 RX ADMIN — Medication 400 MG: at 08:16

## 2022-10-08 RX ADMIN — ACETAMINOPHEN 650 MG: 325 TABLET ORAL at 08:16

## 2022-10-08 RX ADMIN — SERTRALINE HYDROCHLORIDE 25 MG: 25 TABLET ORAL at 08:16

## 2022-10-08 RX ADMIN — ENOXAPARIN SODIUM 120 MG: 150 INJECTION SUBCUTANEOUS at 22:15

## 2022-10-08 RX ADMIN — ONDANSETRON 4 MG: 4 TABLET, ORALLY DISINTEGRATING ORAL at 14:39

## 2022-10-08 RX ADMIN — CIPROFLOXACIN 4 DROP: 3 SOLUTION OPHTHALMIC at 08:24

## 2022-10-08 RX ADMIN — PANTOPRAZOLE SODIUM 40 MG: 40 TABLET, DELAYED RELEASE ORAL at 06:49

## 2022-10-08 RX ADMIN — CIPROFLOXACIN 4 DROP: 3 SOLUTION OPHTHALMIC at 22:15

## 2022-10-08 RX ADMIN — OXYCODONE HYDROCHLORIDE 7.5 MG: 15 TABLET ORAL at 14:41

## 2022-10-08 RX ADMIN — OXYCODONE 5 MG: 5 TABLET ORAL at 10:53

## 2022-10-08 RX ADMIN — BISACODYL 5 MG: 5 TABLET, COATED ORAL at 08:16

## 2022-10-08 RX ADMIN — LEVETIRACETAM 1000 MG: 500 TABLET, FILM COATED ORAL at 08:16

## 2022-10-08 RX ADMIN — METFORMIN HYDROCHLORIDE 500 MG: 500 TABLET ORAL at 17:07

## 2022-10-08 RX ADMIN — OXYCODONE 5 MG: 5 TABLET ORAL at 06:49

## 2022-10-08 RX ADMIN — ACETAMINOPHEN 650 MG: 325 TABLET ORAL at 17:07

## 2022-10-08 RX ADMIN — ACETAMINOPHEN 650 MG: 325 TABLET ORAL at 12:17

## 2022-10-08 ASSESSMENT — PAIN DESCRIPTION - FREQUENCY
FREQUENCY: CONTINUOUS

## 2022-10-08 ASSESSMENT — PAIN DESCRIPTION - PAIN TYPE
TYPE: ACUTE PAIN

## 2022-10-08 ASSESSMENT — PAIN DESCRIPTION - DESCRIPTORS
DESCRIPTORS: ACHING

## 2022-10-08 ASSESSMENT — PAIN DESCRIPTION - ORIENTATION
ORIENTATION: ANTERIOR
ORIENTATION: ANTERIOR
ORIENTATION: RIGHT;LEFT

## 2022-10-08 ASSESSMENT — PAIN SCALES - GENERAL
PAINLEVEL_OUTOF10: 8
PAINLEVEL_OUTOF10: 6
PAINLEVEL_OUTOF10: 8
PAINLEVEL_OUTOF10: 0

## 2022-10-08 ASSESSMENT — PAIN DESCRIPTION - LOCATION
LOCATION: HEAD
LOCATION: GENERALIZED
LOCATION: HEAD
LOCATION: HEAD
LOCATION: GENERALIZED

## 2022-10-08 ASSESSMENT — PAIN DESCRIPTION - ONSET
ONSET: ON-GOING

## 2022-10-08 ASSESSMENT — PAIN - FUNCTIONAL ASSESSMENT
PAIN_FUNCTIONAL_ASSESSMENT: PREVENTS OR INTERFERES SOME ACTIVE ACTIVITIES AND ADLS

## 2022-10-08 NOTE — PROGRESS NOTES
Mom at bedside concerned of her daughters increased pain. States she was not in this much pain at [facility] but she was also very drowsy and slept a lot. Dr Shy Alcantara made aware of this and mother is requesting a CT scan of her head to rule out any swelling. Waiting for response. Bleeding finally stopped. Will attempt another shower tomorrow to wash hair if patient will be up for it.

## 2022-10-08 NOTE — PROGRESS NOTES
Physical Therapy  Facility/Department: [facility] UNIT  Rehabilitation Physical Therapy Initial Assessment and treatment note    NAME: Jeet Dickens  : 1990 (34 y.o.)  MRN: [id number]  CODE STATUS: Full Code    Date of Service: 10/8/22      History reviewed. No pertinent past medical history. No past surgical history on file. Chart Reviewed: Yes  Additional Pertinent Hx: PMH: ADHD, Depression, generalized anxiety  Family / Caregiver Present: Yes ()  Referring Practitioner: Dr. Sheyla Josue  Diagnosis: Debility 2/2 to [facility]  Other (Comment): Needs repetition 2/2 to pt's difficulty with focusing related to pain with HA  General Comment  Comments: Pt was supine in bed when PT arrived. Pt agreeable to therapy with heavy encouragement 2/2 to pt's severe HA    Restrictions:  Position Activity Restriction  Other position/activity restrictions: up with assistance, patient may shower     SUBJECTIVE  Subjective: Pt reports that she has severe pain in both her back and in her head. Pt states that it is 10/10 pain.        Post Treatment Pain Screening       Prior Level of Function:  Social/Functional History  Lives With: Spouse (10 mo son, who is currently still hospitalized at [facility].)  Type of Home: House  Home Layout: One level  Home Access: Level entry  Bathroom Shower/Tub: Tub/Shower unit  Bathroom Toilet: Standard  Bathroom Equipment: Hand-held shower  Bathroom Accessibility: Accessible, Wheelchair accessible, Walker accessible  Home Equipment:  (None)  Has the patient had two or more falls in the past year or any fall with injury in the past year?: No  Receives Help From:  (Did not need help prior to accident)  ADL Assistance: [address]: Independent  Homemaking Responsibilities: Yes  Health Care Management: Primary  Ambulation Assistance: Independent  Transfer Assistance: Independent  Active : Yes  Mode of Transportation: Car, SUV  Education: bachelor's degree  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 Leisure & Hobbies: reading, walking, spending time with [place], playing with animals (3 cats, dog)  Additional Comments:  works for [employer] (not from home), but started 6 weeks paternity leave last week. OBJECTIVE  Vision  Vision: Within Functional Limits    Hearing  Hearing: Within functional limits    Cognition  Overall Cognitive Status: WFL  Arousal/Alertness: Appropriate responses to stimuli  Following Commands: Follows all commands without difficulty  Attention Span: Attends with cues to redirect (increased processing time and cueing needed as patient fatigued)  Memory: Appears intact  Safety Judgement: Decreased awareness of need for assistance  Insights: Decreased awareness of deficits  Initiation: Does not require cues  Sequencing: Does not require cues  Cognition Comment: Pt consistently closed her eyes and reported the \"light\" bothered her making her HA worse    ROM       Strength  Strength RLE  Strength RLE: WFL  Strength LLE  Strength LLE: WFL    Quality of Movement  Tone RLE  RLE Tone: Normotonic  Motor Control  Gross Motor?: WFL    Sensation  Overall Sensation Status: WFL    Functional Mobility  Bed mobility  Bridging: Supervision (VC for energy efficient technique. Bridging used for lat scooting.)  Rolling to Left: Supervision (Req additional time  with VC for energy efficient technique)  Rolling to Right: Supervision  Supine to Sit: Contact guard assistance (Req additional time  with VC for energy efficient technique. Pt appeared unsteady with the transition)  Sit to Supine: Supervision (VC for energy efficient technique)  Scooting: Supervision (unable to david caudal <>cephalo scooting 2/2 the intensity of HA and LBP)  Transfers  Sit to Stand: Contact guard assistance (VC for hand placement .  Pt unsteady with transition req CGA and use of the RW to be stable)  Stand to Sit: Contact guard assistance (Decreased eccentric control with increased urgency when pt reported that she was \"going to be sick\")  Bed to Chair: Contact guard assistance (Used the RW and performed stepping with walker to get to the chair)  Balance  Sitting - Static: Fair;+ (AEB sitting on the EOB unsupported but w/ c/o being \"dizzy\")  Sitting - Dynamic: Fair (AEB ability to todd B slippers)  Standing - Static: Fair (with a RW for ~19 secs ( PT instructed pt to count out loud to monitor the breathing. )Pt made it to 19 and then reported severe nausea with face becoming extremely red.)  Standing - Dynamic: Fair;- (AEB pt's ability to walk with RW)    Environmental Mobility  Ambulation  WB Status: No WB status restrictions noted in chart  Ambulation  Surface: Level tile  Device: Rolling Walker  Assistance: Contact guard assistance;Minimal assistance  Quality of Gait: Asymmetrical step length, difficulty with managing the walker,  Gait Deviations: Staggers  Distance: 20'  More Ambulation?: No  Stairs/Curb  Stairs?: No         ASSESSMENT       Activity Tolerance  Activity Tolerance: Patient limited by fatigue;Patient limited by pain; Patient limited by endurance;Treatment limited secondary to medical complications (C/o pain and BP instability)    Assessment  Assessment: Pt is a 34year old female with a PMH of MDD, ALMAS, and ADHD who presents to the ED via EMS after sustaining a 10 foot fall from her attic to her garage concrete floor. Patient had hit her head and there was blood present on the concrete. In the ED, patient was confused but able to answer questions. 1. inferomedial frontal lobes, greater on the left. 2.  Scattered subarachnoid hemorrhage. 3.  Nondisplaced left mastoid temporal bone fracture with middle ear and mastoid effusion. Pt did not require neurosurgical intervention during hospital stay. Pt presents on this date with severe back pain and HA limiting her abilities to participate fully.  2/2 to the LBP which increased with standing, PT paused during the session and positioned pt back in bed in R sidelying position with CP applied to LB x 20 minutes. PT returned and attempted to assess pt's status further. Pt did get up and walk but was still in severe pain. PT educated pt on the importance of being up in a vertical position and instructed her to be sure that she was sitting up for her meals with additional time added in sitting for each meal. Pt agreed. Pt also c/o dizziness and multiple times throughout session thought that she was going to be \"sick\" No emesis occurred during therapy session. Pt is well below baseline and would benefit from continued therapy to maximzie potential and increase functional mobility towards Ind to allow for a safer d/c to home. Treatment Diagnosis: Debility 2/2 to a TBI  Therapy Prognosis: Good  Decision Making: Medium Complexity  Barriers to Learning: Poor attention, pain and unstable BP  Discharge Recommendations: Home with assist PRN;Home with Home health PT  PT D/C Equipment  Other: Ongoing assessment  PT Equipment Recommendations  Other: Ongoing assessment    CLINICAL IMPRESSION   Pt is a 34year old female with a PMH of MDD, ALMAS, and ADHD who presents to the ED via EMS after sustaining a 10 foot fall from her attic to her garage concrete floor. Patient had hit her head and there was blood present on the concrete. In the ED, patient was confused but able to answer questions. 1. inferomedial frontal lobes, greater on the left. 2.  Scattered subarachnoid hemorrhage. 3.  Nondisplaced left mastoid temporal bone fracture with middle ear and mastoid effusion. Pt did not require neurosurgical intervention during hospital stay. Pt presents on this date with severe back pain and HA limiting her abilities to participate fully. 2/2 to the LBP which increased with standing, PT paused during the session and positioned pt back in bed in R sidelying position with CP applied to LB x 20 minutes.  PT returned and attempted to assess pt's status further. Pt did get up and walk but was still in severe pain. PT educated pt on the importance of being up in a vertical position and instructed her to be sure that she was sitting up for her meals with additional time added in sitting for each meal. Pt agreed. Pt also c/o dizziness and multiple times throughout session thought that she was going to be \"sick\" No emesis occurred during therapy session. Pt is well below baseline and would benefit from continued therapy to maximzie potential and increase functional mobility towards Ind to allow for a safer d/c to home. GOALS  Patient Goals   Patient Goals : \"To get out of here \"  Short Term Goals  Time Frame for Short Term Goals: 2 weeks  Short Term Goal 1: Ind with bed mobility  Short Term Goal 2: Ind with transf excluding floor transf  Short Term Goal 3: Ind with amb on level surfaces distances > 250' at a time  Short Term Goal 4: MI with amb up and down 12 steps ( used as an endurnace goal)  Additional Goals?: No  Long Term Goals  Time Frame for Long Term Goals : STG=LTG    PLAN OF CARE  Frequency: 1-2 treatment sessions per day, 5-7 days per week  Physcial Therapy Plan  General Plan:  (5x week x 60 minutes)  Days Per Week: 5 Days  Hours Per Day: 1 hour  Current Treatment Recommendations: Balance training;Strengthening; Functional mobility training;Transfer training; Endurance training;Gait training;Stair training;Neuromuscular re-education; Safety education & training;Patient/Caregiver education & training;Equipment evaluation, education, & procurement; Therapeutic activities  Safety Devices  Type of Devices: All fall risk precautions in place; Chair alarm in place;Call light within reach; Patient at risk for falls; Left in chair;Nurse notified    EDUCATION  Education  Education Given To: Patient  Education Provided: Role of Therapy;Plan of Care;Home Exercise Program;Safety; Mobility Training;Transfer Training;Energy Conservation;Cognition; Family Education; Fall Prevention Strategies  Education Provided Comments: PT educated pt on the importance of being up in a vertical position and instructed her to be sure that she was sitting up for her meals.   Education Method: Verbal  Barriers to Learning: Other (Comment) (Atention deficit from pain)  Education Outcome: Verbalized understanding;Continued education needed    ELOS:          Therapy Time   Individual Concurrent Group Co-treatment   Time In  1255         Time Out  1340         Minutes  45            Individual Concurrent Group Co-treatment   Time In  1400         Time Out  1420         Minutes  20           Timed Code Treatment Minutes:  14+16    Total Treatment Minutes:   [address], PT, 10/08/22 at 4:03 PM

## 2022-10-08 NOTE — PROGRESS NOTES
Pt. Self transferred to bathroom. RN educated patient on the lavelle of falls and to use call light when in need of assistance. Shift assessment complete, VSS, afebrile, pt. Voiced ongoing pain, PRN oxy and ice pack administered, fee flow sheet. PT. Remained A & O X 4, voiced decreased in pain intensity. RN offered patient opportunity to express milk, pt. Declined. Call light and over bed table within reach, fall prevention measures remains in place. Hourly rounding and frequent visual checks in place.

## 2022-10-08 NOTE — PROGRESS NOTES
Occupational Therapy  Facility/Department: [address] [place] [address]  Occupational Therapy Initial Assessment    Name: Ashish Michelle  : 1990  MRN: [id number]  Date of Service: 10/8/2022    Discharge Recommendations:  Home with assist PRN, Outpatient OT  OT Equipment Recommendations  Equipment Needed: No  Other: continued assessment; may need TTB     Patient Diagnosis(es): There were no encounter diagnoses. Past Medical History:  has no past medical history on file. Past Surgical History:  has no past surgical history on file. Treatment Diagnosis: impaired ADLs, functional transfers and activity tolerance d/t TBI      Assessment   Performance deficits / Impairments: Decreased functional mobility ; Decreased ADL status; Decreased endurance;Decreased high-level IADLs;Decreased cognition;Decreased safe awareness  Assessment: 35 yo patient admit for TBI. Initial evaluation limited by significant headache, pain and overstimulation preventing patient from tolerating BADL tasks well this date; unable to complete a full shower and requiring significant assistance for all tasks completed. Anticipate patient to progress well as pain control improves. Would benefit from ARU OT services in order to increase independence with ADLs, activity tolerance, strength, and safety awareness/cognition for IADL tasks including careproviding for an infant.   Treatment Diagnosis: impaired ADLs, functional transfers and activity tolerance d/t TBI  Prognosis: Guarded;Good  Decision Making: Medium Complexity  Activity Tolerance  Activity Tolerance: Treatment limited secondary to medical complications (free text)        Plan   Occupational Therapy Plan  Times Per Week: 5x/wk x60 min  Current Treatment Recommendations: Strengthening, Balance training, Functional mobility training, Endurance training, Neuromuscular re-education, Self-Care / ADL, Home management training, Safety education & training, Patient/Caregiver education & training, Equipment evaluation, education, & procurement     Restrictions  Position Activity Restriction  Other position/activity restrictions: up with assistance, patient may shower    Subjective   General  Additional Pertinent Hx: 35 yo admit to ED from EMS s/p fall from attic. She was initially a GCS of 15 per EMS but deteriorated to GCS 10 en route and started vomiting. of the head revealed multifocal hemorrhagic contusions within the inferomedial frontal lobes, greater on the left. 2.  Scattered subarachnoid hemorrhage. 3.  Nondisplaced left mastoid temporal bone fracture with middle ear and mastoid effusion. Pt did not require neurosurgical intervention during hospital stay. PMHx:  Family / Caregiver Present: No  Referring Practitioner: Joselo  Diagnosis: SAH  Subjective  Subjective: Patient in bed upon arrival, agreeable to OT evaluation. C/o significant headache, worse with movement and increased lighting but not formally rated. General Comment  Comments: Patient bathed with nursing last night, but RN requesting additional shower with emphasis on washing hair/removing matting as head is still bleeding (and continued to actively bleed/drain throughout session) and is difficult to visualize with current matting of hair. BP elevated, with doctor notified but no intervention yet. BP assessed before (170/90) and after session (162/98) while supine.      Social/Functional History  Social/Functional History  Lives With: Spouse (10 mo son, who is currently still hospitalized at [facility].)  Type of Home: House  Home Layout: One level  Home Access: Level entry  Bathroom Shower/Tub: Tub/Shower unit  Bathroom Toilet: Standard  Bathroom Equipment: Hand-held shower  Bathroom Accessibility: Accessible, Wheelchair accessible, Walker accessible  Home Equipment:  (None)  Has the patient had two or more falls in the past year or any fall with injury in the past year?: No  Receives Help From:  (Did not need help prior to accident)  ADL Assistance: Independent  Homemaking Assistance: Independent  Homemaking Responsibilities: Yes  Bill Paying/Finance Responsibility: Primary (online)  Health Care Management: Primary  Ambulation Assistance: Independent  Transfer Assistance: Independent  Active : Yes  Mode of Transportation: Car, SUV  Education: bachelor's degree  Type of Occupation: previously worked full time in [employer], 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 Leisure & Hobbies: reading, walking, spending time with Dinora Lam, playing with animals (3 cats, dog)  Additional Comments:  works for [employer] (not from home), but started 6 weeks paternity leave last week. Objective   Balance  Sitting:  (SBA for sitting on shower chair with back support)  Toilet Transfers  Toilet Transfers Comments: unable to assess d/t time constraints  Shower Transfers  Shower - Transfer From: Wheelchair  Shower - Transfer Type: To and From  Shower - Transfer To: Transfer tub bench  Shower - Technique: Stand pivot  Shower Transfers: Contact Guard  ADL  Grooming Skilled Clinical Factors: Total assist to comb hair d/t worsening headache with movement, unable to tolerate assessment of other tasks this date  UE Bathing Skilled Clinical Factors: Patient refused to personally attempt d/t severity of headache, not attempted but would require total assist d/t pain. LE Bathing: Dependent/Total  UE Dressing: Setup, SBA   UE Dressing Skilled Clinical Factors: to don hospital gown only; street clothes not available  LE Dressing: Unable to assess(comment)  LE Dressing Skilled Clinical Factors: unable to attempt briefs despite opportunity d/t worsening headache with prolonged sitting in shower.  Total assist to don/doff socks  Bed mobility  Supine to Sit: Contact guard assistance (HOB elevated, increased pain with transitional movement, use of rail)  Sit to Supine: Stand by assistance (HOB elevated)  Transfers  Stand Pivot Transfers: Contact guard assistance  Sit to stand: Contact guard assistance  Stand to sit: Contact guard assistance  Transfer Comments: Patient reported that she was able to walk into bathroom for shower last night with RN; however, not attempted this AM d/t severe headache and elevated BP at start of session in order to maximize ability to functionally participate in session. Vision  Vision: Within Functional Limits  Hearing  Hearing: Within functional limits  Cognition  Overall Cognitive Status: WFL  Arousal/Alertness: Appropriate responses to stimuli  Following Commands:  Follows all commands without difficulty  Attention Span: Attends with cues to redirect (increased processing time and cueing needed as patient fatigued and pain worsened)  Memory: Appears intact  Safety Judgement: Decreased awareness of need for assistance  Insights: Decreased awareness of deficits  Initiation: Does not require cues  Sequencing: Does not require cues  Cognition Comment: Pt consistently closed her eyes and reported the \"light\" bothered her making her HA worse  Orientation  Overall Orientation Status: Within Normal Limits  Orientation Level: Oriented X4  Sensation  Overall Sensation Status: WFL  Education Given To: Patient  Education Provided: Role of Therapy;Plan of Care;ADL Adaptive Strategies;Transfer Training  Education Method: Demonstration;Verbal  Barriers to Learning: Cognition (pain)  Education Outcome: Verbalized understanding;Continued education needed      Goals  Short Term Goals  Time Frame for Short Term Goals: 2 weeks  Short Term Goal 1: Patient will complete LB dressing with MOD I  Short Term Goal 2: Patient will complete bathing with MOD I  Short Term Goal 3: Patient will complete UB dressing at independent level  Short Term Goal 4: Patient will complete functional transfers, including commode transfer and functional transfers, with MOD I  Short Term Goal 5: Patient will complete simple kitchen task with SPVN       Therapy Time Individual Concurrent Group Co-treatment   Time In 0830         Time Out 0930         Minutes [address], OTR/L #[id number]

## 2022-10-08 NOTE — PROGRESS NOTES
Pt. With hx of fall and head injury. Pt. Was noted with bleeding on  pillow after head elastic band was removed. RN noted a open area to top of scalp with moderate amount of blood. RN washed patient's hair, and placed wound consult for laceration to head. Will continue to monitor.

## 2022-10-08 NOTE — PROGRESS NOTES
Dr Josue increased oxy dose to 7.5mg q4. Patient trying to work with therapy but limited due to pain.

## 2022-10-08 NOTE — PROGRESS NOTES
Is This A New Presentation, Or A Follow-Up?: Skin Lesion Patient states she has no improvement after 5mg of oxy. Heis made aware but no new orders at this time. Tylenol given with ice pack. Encouraged patient to keep HOB at 30 degrees.  at bedside. Encouraged patient to use breast pump but patient declines due to pain. Will continue to monitor. What Type Of Note Output Would You Prefer (Optional)?: Standard Output How Severe Is Your Skin Lesion?: mild Has Your Skin Lesion Been Treated?: not been treated

## 2022-10-08 NOTE — PLAN OF CARE
Problem: Discharge Planning  Goal: Discharge to home or other facility with appropriate resources  Outcome: Progressing  Flowsheets (Taken 10/7/2022 2030)  Discharge to home or other facility with appropriate resources: Identify discharge learning needs (meds, wound care, etc)     Problem: Skin/Tissue Integrity  Goal: Absence of new skin breakdown  Description: 1. Monitor for areas of redness and/or skin breakdown  2. Assess vascular access sites hourly  3. Every 4-6 hours minimum:  Change oxygen saturation probe site  4. Every 4-6 hours:  If on nasal continuous positive airway pressure, respiratory therapy assess nares and determine need for appliance change or resting period. Outcome: Progressing   Pt. Able to turn and reposition self in bed. Incontinent of bowel and bladder. Skin integrity will be improved or be maintained buy discharge. Problem: Pain  Goal: Verbalizes/displays adequate comfort level or baseline comfort level  Outcome: Progressing   Pt. Voiced pain > 8/10, PRN pain med and ice administered. P. Voiced decrease in pain intensity. Pain level will be decreased or be at a satisfactory level by discharge.

## 2022-10-08 NOTE — PROGRESS NOTES
Speech Language Pathology  Facility/Department: [facility]  Initial Speech/Language/Cognitive Assessment    NAME: Catracho Flores  : 1990   MRN: [id number]  ADMISSION DATE: 10/7/2022  ADMITTING DIAGNOSIS: has SAH (subarachnoid hemorrhage) ([address] on their problem list.  DATE ONSET: 10/1/22    Date of Eval: 10/8/2022   Evaluating Therapist: SONYA Giles    RECENT RESULTS  CT OF HEAD/MRI: 10/6/22  IMPRESSION:    1. Inferior frontal and left anterior temporal hemorrhagic contusions redemonstrated with similar degree of hemorrhage but increased associated edema and localized mass effect including partial effacement of the frontal horns. 2.  Decreased conspicuity of scattered subarachnoid hemorrhage. 3.  Decrease in size of the bilateral cerebral convexity subdural hematomas. Primary Complaint: unable to complete tasks due to not being able to focus    Pain:  Severe headache pain; RN notified    Vision/ Hearing  Vision  Vision: Within Functional Limits  Hearing  Hearing: Within functional limits (per chart review, pt did not endorse hearing loss, but an AuD performed audiometric evaluation with R ear moderate to severe and L ear moderate rising to normal; evaluation was limited to no masking)    Assessment:  Cognitive Diagnosis: Mild to moderate cognitive-linguistic impairment   Diagnosis: Assessment was limited this date due to pt's severe pain, but based on assessment completed suspect pt with mild to moderate cognitive-linguistic impairment. Pt with absent responses at times requiring cues from SLP to initiate response, but difficult to determine if d/t pain vs ?hearing loss vs delayed initiation. Pt also with impulsivity x1 during an assessmnet task. Memory appears to be intact, long-term, short-term, and working. Suspect attention deficits as pt stated \"I can't focus to get anything done and it's frustrating\"- important to note that pt with ADHD at baseline.  Pt also stated \"I want to be able to plan things and complete the plan to be successful\". Pt will require further cognitive-linguistic assessmnet to determine furhter deficits. Pt is also primary for financial and medication management, so this should also be assessed. When asked if pt with any word finding difficulty, pt stated \"yes\", but there were no instances of observed anomia, paraphasias, or difficulty with communicating with SLP. Recommendations:  Recommendations  Requires SLP Intervention: Yes  Patient Education: Pt and pt's spouse educated to rationale for assessmnet, results, and recommendations. Patient Education Response: Needs reinforcement  Timed Code Treatment Minutes: 0 Minutes  Total Treatment Time: 61       Plan:   Speech Therapy Prognosis  Prognosis Considerations: Age; Family/Community Support;Severity of Impairments;Previous Level of Function  Individuals consulted  Consulted and agree with results and recommendations: Patient;RN;Family member  Family member consulted:  Jud Grace  RN Name: Doc Olman in place: Yes  Type of devices: Bed alarm in place;Call light within reach (reinforced use of call light; pt unable to teach back)    Goals:  Short Term Goals  Goal 1: Pt will complete graded tasks with adequate executive function skills with 90% accuracy independently. Goal 2: Pt will initiate responses <5 seconds across 3 sessions. Goal 3: Pt will demonstrate planning and organization during graded tasks with 90% accuracy independently. Goal 4: Pt will tolerate ongoing cognitive-linguistic assessment as clinically indicated.    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 Leisure & Hobbies: reading, walking, spending time with [employer], playing with animals (3 cats, dog)  Vision  Vision: Within Functional Limits  Hearing  Hearing: Within functional limits (per chart review, pt did not endorse hearing loss, but an AuD performed audiometric evaluation with R ear moderate to severe and L ear moderate rising to normal; evaluation was limited to no masking)           Objective:       Oral Motor   Labial: No impairment  Dentition: Natural  Oral Hygiene: Moist;Clean  Lingual: No impairment    Motor Speech  Apraxic Characteristics: None  Dysarthric Characteristics: None  Intelligibility: No impairment  Overall Impairment Severity: None (flat affect)    Auditory Comprehension  Comprehension: Exceptions  Basic Questions: WFL  Complex Questions: Mild  One Step Commands: WFL  Two Step Commands: WFL  Multistep Commands: WFL  Complex/Abstract Commands: Mild         Expression  Primary Mode of Expression: Verbal    Verbal Expression  Verbal Expression: Within functional limits    Written Expression  Dominant Hand: Right         Pragmatics/Social Functioning  Pragmatics: Exceptions to [facility]  Affect: Moderate (difficult to determine if flat affect vs pain induced quiet flat vocie)  Eye Contact: Mild  Monotone: Mild    Cognition:      Orientation  Overall Orientation Status: Impaired  Orientation Level: Oriented to person;Oriented to situation;Disoriented to place; Disoriented to time  Attention  Attention: Exceptions to [facility]  Sustained Attention: Mild  Memory  Memory: Within Functional Limits (pt WFL for limited assessment tasks)  Problem Solving  Problem Solving: Exceptions to [facility]  Simple Functional Tasks: [facility]  Verbal Reasoning Skills: Mild  Initiation: Moderate (pt with absent responses requiring a cue to provide a response, but difficult to determine if d/t ?hearing loss vs decreased initiation vs pain)  Sequencing: WFL (routine tasks)  Executive Function Skills: Mild  Abstract Reasoning  Abstract Reasoning:  (limited assessment)  Safety/Judgment  Safety/Judgment: Exceptions to [facility] (when asked if pt should get up on own, pt stated \"Yeah if I have to\" with decreased awareness of impairments and risk for falling, educated pt to call light policy)  Insight: Mild    Flexibility of Thought: Mild    Additional Assessments:  Patient was administered portions of the Cognitive-Linguistic Quick Test with the following results:  -Personal facts: pt received a score of 8, which meets the criterion cut score of 8  -Symbol cancellation: pt received a score of 12, which is above the criterion cut score of 11  -Confrontation naming: pt received a score of 10, which meets the criterion cute score of 10  -Clock drawing: pt received a score of 11, which is below the criterion cut score of 12 and falls into the mild range  -Story retelling: pt received a score of 7, which is above the criterion cut score of 6  -Design memory: pt received a score of 5, which meets the criterion cut score of 5  -Mazes: pt received a score of 4, which is below the criterion cut score of 7      Chart review revealed the following results from SLP's previous assessment on 10/2/22  \"Assessment:   Patient presents with moderate cognitive-linguistic deficits in the domains of orientation, short-term memory, problem solving, abstract reasoning, insight, judgement, safety awareness, executive functioning, and attention. The patient was administered the Cognistat which assesses orientation, attention, language, constructional ability, memory, reasoning, and judgement.  The patient's composite score is 44 / 76 with the following breakdown:    Component Score Total Severity of Deficits   Orientation 8 12 mild   Attention 8 8 WFL   Comprehension 6 6 WFL   Repetition 12 12 WFL   Naming 5 8 mild   Constructional Ability X X DNT   Memory 4 12 severe   Calculations 1 4 moderate   Similarities 0 8 severe   Judgement 0 6 severe   Composite 44 76\"       Prognosis:  Speech Therapy Prognosis  Prognosis Considerations: Age; Family/Community Support;Severity of Impairments;Previous Level of Function  Individuals consulted  Consulted and agree with results and recommendations: Patient;RN;Family member  Family member consulted:  Frida Queen RN Name: Harsha Iglesias    Education:  Patient Education: Pt and pt's spouse educated to rationale for [employer], results, and recommendations.   Patient Education Response: Needs reinforcement  Safety Devices in place: Yes  Type of devices: Bed alarm in place;Call light within reach (reinforced use of call light; pt unable to teach back)    Therapy Time:   Individual Concurrent Group Co-treatment   Time In 1030, 1130 0000 0000 0000   Time Out 1100, 1200 0000 0000 0000   Minutes 60 0 0 0   Variance: 0  Timed Code Treatment Minutes: 0 Minutes  Total Treatment Time: [address] [place], [place], [address] [facility]  Speech-Language Pathologist

## 2022-10-08 NOTE — PROGRESS NOTES
Pharmacy called to inform of 's warning regarding taking Cozaar while breast feeding. The patient should either discontinue Cozaar or not breast feed. RN informed Dr. Sharee Jacobo of the 's information and patient's current BP of 132/71 with HR of 77.  DO informed to discharge med and monitor patient's BP.

## 2022-10-08 NOTE — PROGRESS NOTES
BP improved. 5mg of oxycodone given for headache. Laceration continues to bleed. D-stat and gauze placed. Patient resting.      Vitals:    10/08/22 1106   BP: 120/77   Pulse: 79   Resp:    Temp:    SpO2:

## 2022-10-08 NOTE — CONSULTS
Nutrition Note       NUTRITION RECOMMENDATIONS:   1. PO Diet: Continue current diet    2. ONS: if po intake <50%, add ONS to promote intake    NUTRITION ASSESSMENT:  Nutritional summary & status: Consult for new admit to ARU. Pt working w/ PT/OT during attempted encounter. EMR shows no signficiant wt loss. Pt w/ no nutritional related pmhx. PO intake recorded >75%. No nutritional concerns at this time. Will continue to follow per [facility] Admission/PMH: subarachnoid hemorrhage following 10ft fall from attic // MDD, ALMAS, ADHD    MALNUTRITION ASSESSMENT  Context of Malnutrition: Acute Illness   Malnutrition Status: No malnutrition    NUTRITION DIAGNOSIS   No nutrition diagnosis at this time     [address] and/or Nutrient Delivery:  Continue Current Diet  Nutrition Education/Counseling:  No recommendation at this time       The patient will still be monitored per nutrition standards of care. Consult dietitian if nutrition interventions essential to patient care is needed.      [address], [address] [place] [address]  [place]:  [phone]  Office:  [phone]

## 2022-10-08 NOTE — PLAN OF CARE
Problem: Discharge Planning  Goal: Discharge to home or other facility with appropriate resources  10/8/2022 0310 by Shane Rojas RN  Outcome: Progressing  Flowsheets (Taken 10/7/2022 2030)  Discharge to home or other facility with appropriate resources: Identify discharge learning needs (meds, wound care, etc)     Problem: Skin/Tissue Integrity  Goal: Absence of new skin breakdown  Description: 1. Monitor for areas of redness and/or skin breakdown  2. Assess vascular access sites hourly  3. Every 4-6 hours minimum:  Change oxygen saturation probe site  4. Every 4-6 hours:  If on nasal continuous positive airway pressure, respiratory therapy assess nares and determine need for appliance change or resting period. 10/8/2022 0908 by Jaya Graham RN  Outcome: Progressing  Note: Scattered bruising/lacerations/abrasions due to fall at home. New wound found on posterior scalp. Possible scab that opened up. Wound care consulted. Problem: Pain  Goal: Verbalizes/displays adequate comfort level or baseline comfort level  10/8/2022 0908 by Jaya Graham RN  Outcome: Not Progressing  Flowsheets (Taken 10/8/2022 0908)  Verbalizes/displays adequate comfort level or baseline comfort level:   Assess pain using appropriate pain scale   Administer analgesics based on type and severity of pain and evaluate response  Note: Patient complains of generalized pain.  Medicated with 5mg of oxy before shift change and tylenol with breakfast.   10/8/2022 0310 by Shane Rojas RN  Outcome: Progressing

## 2022-10-09 LAB
GLUCOSE BLD-MCNC: 107 MG/DL (ref 70–99)
GLUCOSE BLD-MCNC: 113 MG/DL (ref 70–99)
GLUCOSE BLD-MCNC: 97 MG/DL (ref 70–99)
GLUCOSE BLD-MCNC: 97 MG/DL (ref 70–99)
PERFORMED ON: ABNORMAL
PERFORMED ON: ABNORMAL
PERFORMED ON: NORMAL
PERFORMED ON: NORMAL

## 2022-10-09 PROCEDURE — 6360000002 HC RX W HCPCS: Performed by: PHYSICAL MEDICINE & REHABILITATION

## 2022-10-09 PROCEDURE — 1280000000 HC REHAB R&B

## 2022-10-09 PROCEDURE — 6360000002 HC RX W HCPCS: Performed by: NEUROLOGICAL SURGERY

## 2022-10-09 PROCEDURE — 6370000000 HC RX 637 (ALT 250 FOR IP): Performed by: PHYSICAL MEDICINE & REHABILITATION

## 2022-10-09 RX ORDER — INSULIN LISPRO 100 [IU]/ML
0-4 INJECTION, SOLUTION INTRAVENOUS; SUBCUTANEOUS NIGHTLY
Status: DISCONTINUED | OUTPATIENT
Start: 2022-10-09 | End: 2022-10-14 | Stop reason: HOSPADM

## 2022-10-09 RX ORDER — DEXAMETHASONE 4 MG/1
2 TABLET ORAL DAILY
Status: COMPLETED | OUTPATIENT
Start: 2022-10-13 | End: 2022-10-13

## 2022-10-09 RX ORDER — DEXAMETHASONE 4 MG/1
2 TABLET ORAL EVERY 8 HOURS SCHEDULED
Status: COMPLETED | OUTPATIENT
Start: 2022-10-10 | End: 2022-10-11

## 2022-10-09 RX ORDER — DEXAMETHASONE 4 MG/1
2 TABLET ORAL EVERY 12 HOURS
Status: COMPLETED | OUTPATIENT
Start: 2022-10-11 | End: 2022-10-12

## 2022-10-09 RX ORDER — DEXAMETHASONE 4 MG/1
2 TABLET ORAL EVERY 6 HOURS SCHEDULED
Status: COMPLETED | OUTPATIENT
Start: 2022-10-09 | End: 2022-10-10

## 2022-10-09 RX ORDER — INSULIN LISPRO 100 [IU]/ML
0-8 INJECTION, SOLUTION INTRAVENOUS; SUBCUTANEOUS
Status: DISCONTINUED | OUTPATIENT
Start: 2022-10-09 | End: 2022-10-14 | Stop reason: HOSPADM

## 2022-10-09 RX ADMIN — ONDANSETRON 4 MG: 4 TABLET, ORALLY DISINTEGRATING ORAL at 02:15

## 2022-10-09 RX ADMIN — LEVETIRACETAM 1000 MG: 500 TABLET, FILM COATED ORAL at 10:04

## 2022-10-09 RX ADMIN — ENOXAPARIN SODIUM 120 MG: 150 INJECTION SUBCUTANEOUS at 10:07

## 2022-10-09 RX ADMIN — ENOXAPARIN SODIUM 120 MG: 150 INJECTION SUBCUTANEOUS at 21:48

## 2022-10-09 RX ADMIN — DEXAMETHASONE 2 MG: 4 TABLET ORAL at 17:27

## 2022-10-09 RX ADMIN — DEXAMETHASONE 2 MG: 4 TABLET ORAL at 23:37

## 2022-10-09 RX ADMIN — OXYCODONE HYDROCHLORIDE 7.5 MG: 15 TABLET ORAL at 02:16

## 2022-10-09 RX ADMIN — PANTOPRAZOLE SODIUM 40 MG: 40 TABLET, DELAYED RELEASE ORAL at 06:29

## 2022-10-09 RX ADMIN — DEXAMETHASONE 2 MG: 4 TABLET ORAL at 11:50

## 2022-10-09 RX ADMIN — BISACODYL 5 MG: 5 TABLET, COATED ORAL at 10:04

## 2022-10-09 RX ADMIN — METFORMIN HYDROCHLORIDE 500 MG: 500 TABLET ORAL at 17:26

## 2022-10-09 RX ADMIN — SERTRALINE HYDROCHLORIDE 25 MG: 25 TABLET ORAL at 10:04

## 2022-10-09 RX ADMIN — ACETAMINOPHEN 650 MG: 325 TABLET ORAL at 11:50

## 2022-10-09 RX ADMIN — ACETAMINOPHEN 650 MG: 325 TABLET ORAL at 06:32

## 2022-10-09 RX ADMIN — OXYCODONE HYDROCHLORIDE 7.5 MG: 15 TABLET ORAL at 13:05

## 2022-10-09 RX ADMIN — Medication 400 MG: at 10:04

## 2022-10-09 RX ADMIN — LEVETIRACETAM 1000 MG: 500 TABLET, FILM COATED ORAL at 21:48

## 2022-10-09 RX ADMIN — OXYCODONE HYDROCHLORIDE 7.5 MG: 15 TABLET ORAL at 17:26

## 2022-10-09 ASSESSMENT — PAIN - FUNCTIONAL ASSESSMENT
PAIN_FUNCTIONAL_ASSESSMENT: ACTIVITIES ARE NOT PREVENTED
PAIN_FUNCTIONAL_ASSESSMENT: PREVENTS OR INTERFERES SOME ACTIVE ACTIVITIES AND ADLS
PAIN_FUNCTIONAL_ASSESSMENT: ACTIVITIES ARE NOT PREVENTED
PAIN_FUNCTIONAL_ASSESSMENT: ACTIVITIES ARE NOT PREVENTED

## 2022-10-09 ASSESSMENT — PAIN DESCRIPTION - ONSET
ONSET: ON-GOING

## 2022-10-09 ASSESSMENT — PAIN DESCRIPTION - LOCATION
LOCATION: HEAD

## 2022-10-09 ASSESSMENT — PAIN DESCRIPTION - ORIENTATION
ORIENTATION: ANTERIOR
ORIENTATION: OTHER (COMMENT)
ORIENTATION: ANTERIOR
ORIENTATION: ANTERIOR

## 2022-10-09 ASSESSMENT — PAIN DESCRIPTION - PAIN TYPE
TYPE: ACUTE PAIN

## 2022-10-09 ASSESSMENT — PAIN SCALES - GENERAL
PAINLEVEL_OUTOF10: 7
PAINLEVEL_OUTOF10: 7
PAINLEVEL_OUTOF10: 6
PAINLEVEL_OUTOF10: 8
PAINLEVEL_OUTOF10: 6
PAINLEVEL_OUTOF10: 6

## 2022-10-09 ASSESSMENT — PAIN DESCRIPTION - DESCRIPTORS
DESCRIPTORS: ACHING
DESCRIPTORS: SHARP
DESCRIPTORS: ACHING
DESCRIPTORS: ACHING

## 2022-10-09 ASSESSMENT — PAIN DESCRIPTION - FREQUENCY
FREQUENCY: CONTINUOUS

## 2022-10-09 NOTE — PROGRESS NOTES
Shift assessment complete. BP slightly elevated. A/Ox3. GCS 15. Fall precautions in place. Continues to have 8/10 headache but refusing oxycodone. Ice pack placed. Tylenol was given at shift change. Continues to refuse to pump breast milk. Patient started her period 2 days ago. No more signs of bleeding to back of scalp. Offered shower but refused. Call light in reach. NSY will compare CT results. Will continue to monitor.      Vitals:    10/09/22 0923   BP: (!) 165/100   Pulse: 77   Resp: 18   Temp: 98.7 °F (37.1 °C)   SpO2: 96%

## 2022-10-09 NOTE — PROGRESS NOTES
Pt in bed, eyes closed but easily aroused. Alert & oriented x 3. BP elevated, other VSS. Assessment completed. No complaints at this time. Nighttime medications given, pt tolerated well. Reminded pt to call for assistance with any needs. Call light within reach. Safety measures in place.

## 2022-10-09 NOTE — PLAN OF CARE
Problem: Skin/Tissue Integrity  Goal: Absence of new skin breakdown  Description: 1.   Monitor for areas of redness and/or skin breakdown  Outcome: Progressing     Problem: Pain  Goal: Verbalizes/displays adequate comfort level or baseline comfort level  Flowsheets (Taken 10/9/2022 0238)  Verbalizes/displays adequate comfort level or baseline comfort level:   Encourage patient to monitor pain and request assistance   Assess pain using appropriate pain scale   Administer analgesics based on type and severity of pain and evaluate response     Problem: ABCDS Injury Assessment  Goal: Absence of physical injury  Outcome: Progressing  Flowsheets (Taken 10/9/2022 0238)  Absence of Physical Injury: Implement safety measures based on patient assessment

## 2022-10-09 NOTE — PROGRESS NOTES
Dr Jimy Linda confirmed normal changes in CT results. Recommends decadron taper over 4 days.  [facility] informed. Patient updated.

## 2022-10-09 NOTE — PROGRESS NOTES
Informed Dr Kathleen Perez of last night CT results. No comment of comparison to 's CT. Dr Kathleen Perez asked to consult Neurosurgery. Message sent to Dr Zaki Maddox who's on-call to compare CT results.

## 2022-10-10 LAB
ANION GAP SERPL CALCULATED.3IONS-SCNC: 11 MMOL/L (ref 3–16)
BASOPHILS ABSOLUTE: 0.1 K/UL (ref 0–0.2)
BASOPHILS RELATIVE PERCENT: 0.7 %
BUN BLDV-MCNC: 14 MG/DL (ref 7–20)
CALCIUM SERPL-MCNC: 9.4 MG/DL (ref 8.3–10.6)
CHLORIDE BLD-SCNC: 95 MMOL/L (ref 99–110)
CO2: 27 MMOL/L (ref 21–32)
CREAT SERPL-MCNC: 0.6 MG/DL (ref 0.6–1.1)
EOSINOPHILS ABSOLUTE: 0.1 K/UL (ref 0–0.6)
EOSINOPHILS RELATIVE PERCENT: 1 %
GFR AFRICAN AMERICAN: >60
GFR NON-AFRICAN AMERICAN: >60
GLUCOSE BLD-MCNC: 113 MG/DL (ref 70–99)
GLUCOSE BLD-MCNC: 119 MG/DL (ref 70–99)
GLUCOSE BLD-MCNC: 120 MG/DL (ref 70–99)
GLUCOSE BLD-MCNC: 123 MG/DL (ref 70–99)
GLUCOSE BLD-MCNC: 145 MG/DL (ref 70–99)
HCT VFR BLD CALC: 37.7 % (ref 36–48)
HEMOGLOBIN: 12.7 G/DL (ref 12–16)
LYMPHOCYTES ABSOLUTE: 2.4 K/UL (ref 1–5.1)
LYMPHOCYTES RELATIVE PERCENT: 22.5 %
MAGNESIUM: 2 MG/DL (ref 1.8–2.4)
MCH RBC QN AUTO: 27.7 PG (ref 26–34)
MCHC RBC AUTO-ENTMCNC: 33.8 G/DL (ref 31–36)
MCV RBC AUTO: 82.1 FL (ref 80–100)
MONOCYTES ABSOLUTE: 0.6 K/UL (ref 0–1.3)
MONOCYTES RELATIVE PERCENT: 5.3 %
NEUTROPHILS ABSOLUTE: 7.4 K/UL (ref 1.7–7.7)
NEUTROPHILS RELATIVE PERCENT: 70.5 %
PDW BLD-RTO: 14.3 % (ref 12.4–15.4)
PERFORMED ON: ABNORMAL
PLATELET # BLD: 257 K/UL (ref 135–450)
PMV BLD AUTO: 7.8 FL (ref 5–10.5)
POTASSIUM REFLEX MAGNESIUM: 3.5 MMOL/L (ref 3.5–5.1)
RBC # BLD: 4.59 M/UL (ref 4–5.2)
SODIUM BLD-SCNC: 133 MMOL/L (ref 136–145)
WBC # BLD: 10.5 K/UL (ref 4–11)

## 2022-10-10 PROCEDURE — 97129 THER IVNTJ 1ST 15 MIN: CPT

## 2022-10-10 PROCEDURE — 97530 THERAPEUTIC ACTIVITIES: CPT

## 2022-10-10 PROCEDURE — 99232 SBSQ HOSP IP/OBS MODERATE 35: CPT | Performed by: PHYSICAL MEDICINE & REHABILITATION

## 2022-10-10 PROCEDURE — 6360000002 HC RX W HCPCS: Performed by: PHYSICAL MEDICINE & REHABILITATION

## 2022-10-10 PROCEDURE — 97110 THERAPEUTIC EXERCISES: CPT

## 2022-10-10 PROCEDURE — 1280000000 HC REHAB R&B

## 2022-10-10 PROCEDURE — 97535 SELF CARE MNGMENT TRAINING: CPT

## 2022-10-10 PROCEDURE — 83735 ASSAY OF MAGNESIUM: CPT

## 2022-10-10 PROCEDURE — 6360000002 HC RX W HCPCS: Performed by: NEUROLOGICAL SURGERY

## 2022-10-10 PROCEDURE — 80048 BASIC METABOLIC PNL TOTAL CA: CPT

## 2022-10-10 PROCEDURE — 6370000000 HC RX 637 (ALT 250 FOR IP): Performed by: PHYSICAL MEDICINE & REHABILITATION

## 2022-10-10 PROCEDURE — 85025 COMPLETE CBC W/AUTO DIFF WBC: CPT

## 2022-10-10 PROCEDURE — 36415 COLL VENOUS BLD VENIPUNCTURE: CPT

## 2022-10-10 PROCEDURE — 97130 THER IVNTJ EA ADDL 15 MIN: CPT

## 2022-10-10 PROCEDURE — 6370000000 HC RX 637 (ALT 250 FOR IP): Performed by: NURSE PRACTITIONER

## 2022-10-10 PROCEDURE — 97116 GAIT TRAINING THERAPY: CPT

## 2022-10-10 RX ORDER — BUTALBITAL, ACETAMINOPHEN AND CAFFEINE 50; 325; 40 MG/1; MG/1; MG/1
1 TABLET ORAL EVERY 4 HOURS PRN
Status: DISCONTINUED | OUTPATIENT
Start: 2022-10-10 | End: 2022-10-14 | Stop reason: HOSPADM

## 2022-10-10 RX ORDER — TRAZODONE HYDROCHLORIDE 50 MG/1
50 TABLET ORAL NIGHTLY PRN
Status: DISCONTINUED | OUTPATIENT
Start: 2022-10-10 | End: 2022-10-14 | Stop reason: HOSPADM

## 2022-10-10 RX ORDER — BACITRACIN ZINC AND POLYMYXIN B SULFATE 500; 1000 [USP'U]/G; [USP'U]/G
OINTMENT TOPICAL 2 TIMES DAILY
Status: DISCONTINUED | OUTPATIENT
Start: 2022-10-10 | End: 2022-10-14 | Stop reason: HOSPADM

## 2022-10-10 RX ORDER — MECOBALAMIN 5000 MCG
5 TABLET,DISINTEGRATING ORAL NIGHTLY
Status: DISCONTINUED | OUTPATIENT
Start: 2022-10-10 | End: 2022-10-14 | Stop reason: HOSPADM

## 2022-10-10 RX ADMIN — ACETAMINOPHEN 650 MG: 325 TABLET ORAL at 08:18

## 2022-10-10 RX ADMIN — DEXAMETHASONE 2 MG: 4 TABLET ORAL at 06:42

## 2022-10-10 RX ADMIN — METFORMIN HYDROCHLORIDE 500 MG: 500 TABLET ORAL at 17:04

## 2022-10-10 RX ADMIN — DEXAMETHASONE 2 MG: 4 TABLET ORAL at 20:55

## 2022-10-10 RX ADMIN — ACETAMINOPHEN 650 MG: 325 TABLET ORAL at 12:15

## 2022-10-10 RX ADMIN — OXYCODONE HYDROCHLORIDE 7.5 MG: 15 TABLET ORAL at 02:44

## 2022-10-10 RX ADMIN — PANTOPRAZOLE SODIUM 40 MG: 40 TABLET, DELAYED RELEASE ORAL at 06:42

## 2022-10-10 RX ADMIN — LEVETIRACETAM 1000 MG: 500 TABLET, FILM COATED ORAL at 20:56

## 2022-10-10 RX ADMIN — BACITRACIN ZINC AND POLYMYXIN B SULFATE: 500; 10000 OINTMENT TOPICAL at 20:47

## 2022-10-10 RX ADMIN — OXYCODONE HYDROCHLORIDE 7.5 MG: 15 TABLET ORAL at 10:06

## 2022-10-10 RX ADMIN — METFORMIN HYDROCHLORIDE 500 MG: 500 TABLET ORAL at 08:18

## 2022-10-10 RX ADMIN — LEVETIRACETAM 1000 MG: 500 TABLET, FILM COATED ORAL at 08:17

## 2022-10-10 RX ADMIN — BUTALBITAL, ACETAMINOPHEN, AND CAFFEINE 1 TABLET: 50; 325; 40 TABLET ORAL at 17:03

## 2022-10-10 RX ADMIN — ENOXAPARIN SODIUM 120 MG: 150 INJECTION SUBCUTANEOUS at 20:49

## 2022-10-10 RX ADMIN — SERTRALINE HYDROCHLORIDE 25 MG: 25 TABLET ORAL at 08:17

## 2022-10-10 RX ADMIN — Medication 5 MG: at 20:56

## 2022-10-10 RX ADMIN — Medication 400 MG: at 08:17

## 2022-10-10 RX ADMIN — ENOXAPARIN SODIUM 120 MG: 150 INJECTION SUBCUTANEOUS at 08:18

## 2022-10-10 RX ADMIN — DEXAMETHASONE 2 MG: 4 TABLET ORAL at 16:23

## 2022-10-10 ASSESSMENT — PAIN SCALES - GENERAL
PAINLEVEL_OUTOF10: 0
PAINLEVEL_OUTOF10: 7
PAINLEVEL_OUTOF10: 5
PAINLEVEL_OUTOF10: 6
PAINLEVEL_OUTOF10: 8
PAINLEVEL_OUTOF10: 6
PAINLEVEL_OUTOF10: 0
PAINLEVEL_OUTOF10: 3
PAINLEVEL_OUTOF10: 9
PAINLEVEL_OUTOF10: 3
PAINLEVEL_OUTOF10: 6
PAINLEVEL_OUTOF10: 7

## 2022-10-10 ASSESSMENT — PAIN DESCRIPTION - LOCATION
LOCATION: HEAD

## 2022-10-10 ASSESSMENT — PAIN DESCRIPTION - DESCRIPTORS
DESCRIPTORS: ACHING
DESCRIPTORS: ACHING;THROBBING;POUNDING
DESCRIPTORS: ACHING
DESCRIPTORS: ACHING

## 2022-10-10 ASSESSMENT — PAIN - FUNCTIONAL ASSESSMENT
PAIN_FUNCTIONAL_ASSESSMENT: PREVENTS OR INTERFERES SOME ACTIVE ACTIVITIES AND ADLS
PAIN_FUNCTIONAL_ASSESSMENT: ACTIVITIES ARE NOT PREVENTED
PAIN_FUNCTIONAL_ASSESSMENT: PREVENTS OR INTERFERES SOME ACTIVE ACTIVITIES AND ADLS
PAIN_FUNCTIONAL_ASSESSMENT: ACTIVITIES ARE NOT PREVENTED
PAIN_FUNCTIONAL_ASSESSMENT: PREVENTS OR INTERFERES SOME ACTIVE ACTIVITIES AND ADLS
PAIN_FUNCTIONAL_ASSESSMENT: ACTIVITIES ARE NOT PREVENTED

## 2022-10-10 ASSESSMENT — PAIN DESCRIPTION - ONSET
ONSET: ON-GOING

## 2022-10-10 ASSESSMENT — PAIN DESCRIPTION - PAIN TYPE
TYPE: ACUTE PAIN

## 2022-10-10 ASSESSMENT — PAIN DESCRIPTION - FREQUENCY
FREQUENCY: CONTINUOUS

## 2022-10-10 ASSESSMENT — PAIN DESCRIPTION - ORIENTATION
ORIENTATION: ANTERIOR
ORIENTATION: RIGHT;LEFT;ANTERIOR;POSTERIOR
ORIENTATION: RIGHT;LEFT;ANTERIOR
ORIENTATION: ANTERIOR;POSTERIOR
ORIENTATION: ANTERIOR
ORIENTATION: RIGHT;LEFT;ANTERIOR

## 2022-10-10 NOTE — CONSULTS
NEUROSURGERY CONSULT NOTE    Viktor Ernst  [id number]   1990   10/10/2022    Requesting physician: Marisela Josue DO    Reason for consultation: SAH    History of present illness: Patient is a 34 y.o. female that has past medical history of ADHD, Depression, Generalized Anxiety Disorder, Subarachnoid Hemorrhage, Superior endplate deformities of T11 and T12, and left mastoid Temporal bone fracture (CMS Dx) with middle ear and mastoid effusion. Patient presented on 10/7/2022 to [address] [place] [address] for rehab. Patient had a 10 foot fall from her attic to her garage concrete floor on 10/01/2022. Patient was seen at [facility] and found to have multifocal hemorrhagic contusions within the inferomedial frontal lobes, scattered subarachnoid hemorrhage, and a nondisplaced left mastoid temporal bone fracture with middle ear and mastoid effusion. Pt did not require neurosurgical intervention during hospital stay. Patient discharged to [facility] ARU. Patient continues to c/o headache. CT Head was reveiwed by Dr. Carla Lawler who confirmed normal changes in CT results. ROS:   GENERAL:  Generalized weakness; Denies fever or recent illness. Denies weight changes   EYES:  Denies vision change or diplopia  EARS:  Denies hearing loss  CARDIAC:  Denies chest pain  RESPIRATORY:  Denies shortness of breath  SKIN:  Denies rash or lesions   HEM:  Denies excessive bruising  PSYCH:  Denies anxiety or depression  NEURO:  Endorses headache, numbness or tingling or lateralizing weakness   :  Denies urinary difficulty  GI: Denies nausea, vomiting, diarrhea or constipation  MUSCULOSKELETAL:  No arthralgias    No Known Allergies    History reviewed. No pertinent past medical history. No past surgical history on file.     Social History     Occupational History    Not on file   Tobacco Use    Smoking status: Not on file    Smokeless tobacco: Not on file   Substance and Sexual Activity    Alcohol use: Not on file    Drug use: Not on file    Sexual activity: Not on file        No family history on file.      Outpatient Medications Marked as Taking for the 10/7/22 encounter [facility] Encounter)   Medication Sig Dispense Refill    losartan (COZAAR) 25 MG tablet Take 25 mg by mouth daily      loratadine (CLARITIN) 10 MG tablet Take 10 mg by mouth daily          Current Facility-Administered Medications   Medication Dose Route Frequency Provider Last Rate Last Admin    melatonin disintegrating tablet 5 mg  5 mg Oral Nightly Augustus L Heis, DO        traZODone (DESYREL) tablet 50 mg  50 mg Oral Nightly PRN Augustus BRIELLE Heis, DO        butalbital-acetaminophen-caffeine (FIORICET, ESGIC) per tablet 1 tablet  1 tablet Oral Q4H PRN Sagrario Cake Rujackd, APRN - CNP        dexamethasone (DECADRON) tablet 2 mg  2 mg Oral 3 times per day Austyn De Jesus MD        Followed by    Saintclair Muskrat ON 10/11/2022] dexamethasone (DECADRON) tablet 2 mg  2 mg Oral Q12H Austyn De Jesus MD        Followed by    Saintclair Muskrat ON 10/13/2022] dexamethasone (DECADRON) tablet 2 mg  2 mg Oral Daily Austyn De Jesus MD        insulin lispro (1 Unit Dial) (HUMALOG/ADMELOG) pen 0-4 Units  0-4 Units SubCUTAneous Nightly Augustus L Chandanais, DO        insulin lispro (1 Unit Dial) (HUMALOG/ADMELOG) pen 0-8 Units  0-8 Units SubCUTAneous TID WC Augustus BRIELLE Heis, DO        oxyCODONE (OXY-IR) immediate release tablet 7.5 mg  7.5 mg Oral Q4H PRN Augustus L Heis, DO   7.5 mg at 10/10/22 1006    acetaminophen (TYLENOL) tablet 650 mg  650 mg Oral Q4H PRN Augustus L Heis, DO   650 mg at 10/10/22 1215    enoxaparin (LOVENOX) injection 120 mg  1 mg/kg (Order-Specific) SubCUTAneous BID Augustus L Heis, DO   120 mg at 10/10/22 0818    levETIRAcetam (KEPPRA) tablet 1,000 mg  1,000 mg Oral BID Augustus L Heis, DO   1,000 mg at 10/10/22 0817    magnesium oxide (MAG-OX) tablet 400 mg  400 mg Oral Daily Augustus Josue, DO   400 mg at 10/10/22 0817    ondansetron (ZOFRAN-ODT) disintegrating tablet 4 mg  4 mg Oral Q8H PRN Augustus Josue, DO   4 mg at 10/09/22 0215    sertraline (ZOLOFT) tablet 25 mg  25 mg Oral Daily Augustus L Heis, DO   25 mg at 10/10/22 0817    metFORMIN (GLUCOPHAGE) tablet 500 mg  500 mg Oral BID WC Augustus L Heis, DO   500 mg at 10/10/22 0818    bisacodyl (DULCOLAX) EC tablet 5 mg  5 mg Oral Daily Augustus L Heis, DO   5 mg at 10/09/22 1004    magnesium hydroxide (MILK OF MAGNESIA) 400 MG/5ML suspension 30 mL  30 mL Oral Daily PRN Augustus L Heis, DO        polyethylene glycol (GLYCOLAX) packet 17 g  17 g Oral Daily PRN Augustus L Heis, DO        glucose chewable tablet 16 g  4 tablet Oral PRN Augustus L Heis, DO        dextrose bolus 10% 125 mL  125 mL IntraVENous PRN Augustus L Heis, DO        Or    dextrose bolus 10% 250 mL  250 mL IntraVENous PRN Augustus L Heis, DO        glucagon (rDNA) injection 1 mg  1 mg SubCUTAneous PRN Augustus L Heis, DO        dextrose 10 % infusion   IntraVENous Continuous PRN Augustsu L Heis, DO        pantoprazole (PROTONIX) tablet 40 mg  40 mg Oral QAM AC Augustus L Heis, DO   40 mg at 10/10/22 3230        Objective:  BP (!) 145/94   Pulse 82   Temp 97.9 °F (36.6 °C) (Oral)   Resp 16   Ht 5' 3\" (1.6 m) Comment: 5 foot 3  Wt 272 lb 14.9 oz (123.8 kg)   SpO2 96%   BMI 48.35 kg/m²     Physical Exam:   Patient seen and examined  GCS:  4 - Opens eyes on own  5 - Alert and oriented  6 - Follows simple motor commands  General: Well developed. Alert and cooperative in no acute distress. HENT: atraumatic, neck supple  Eyes: Optic discs: Not tested  Pulmonary: unlabored respiratory effort  Cardiovascular:  Warm well perfused.  No peripheral edema  Gastrointestinal: abdomen soft, NT, ND    Neurological:  Mental Status: Awake, alert, oriented x 4, speech clear and appropriate  Attention: Intact  Language: No aphasia or dysarthria noted  Sensation: Intact to all extremities to light touch  Coordination: Intact    Cranial Nerves:  II: Visual acuity not tested, denies new visual changes / diplopia  III, IV, VI: PERRL, 3 mm bilaterally, EOMI, no nystagmus noted  V: Facial sensation intact bilaterally to touch  VII: Face symmetric  VIII: Hearing intact bilaterally to spoken voice  IX: Palate movement equal bilaterally  XI: Shoulder shrug equal bilaterally  XII: Tongue midline    Musculoskeletal:   Gait: Not tested   Assist devices: None   Tone: Normal  Motor strength:    Right  Left    Right  Left    Deltoid  5 5  Hip Flex  5 5   Biceps  5 5  Knee Extensors  5 5   Triceps  5 5  Knee Flexors  5 5   Wrist Ext  5 5  Ankle Dorsiflex. 5 5   Wrist Flex  5 5  Ankle Plantarflex. 5 5   Handgrip  5 5  Ext Kleber Longus  5 5   Thumb Ext  5 5         Radiological Findings:  CT HEAD WO CONTRAST  Result Date: 10/6/2022  OSH  1. Inferior frontal and left anterior temporal hemorrhagic contusions redemonstrated with similar degree of hemorrhage but increased associated edema and localized mass effect including partial effacement of the frontal horns. 2.  Decreased conspicuity of scattered subarachnoid hemorrhage. 3.  Decrease in size of the bilateral cerebral convexity subdural hematomas. CT HEAD WO CONTRAST  Result Date: 10/8/2022  1. Large anterior frontal lobe hemorrhagic contusions, left greater than right, with associated edema. 2.  Small anterior left temporal lobe hemorrhagic contusion. 3.  Trace amount of subdural hematoma anteriorly along the falx. Small left cerebral convexity and tentorial subdural hematoma. 4.  2-3 mm of left-to-right anterior subfalcine shift. 5.  No hydrocephalus or descending transtentorial herniation. Labs:  Recent Labs     10/10/22  0641   WBC 10.5   HGB 12.7   HCT 37.7          Recent Labs     10/10/22  0641   *   K 3.5   CL 95*   CO2 27   BUN 14   CREATININE 0.6   GLUCOSE 120*   CALCIUM 9.4   MG 2.00       No results for input(s): PROTIME, INR, APTT in the last 72 hours.     Patient Active Problem List    Diagnosis Date Noted    SAH (subarachnoid hemorrhage) ([facility] [address] 10/07/2022 Assessment:  Patient is a 34 y.o. female w/10 foot fall from her attic to her garage concrete floor on 10/01/2022. Patient was seen at [facility] and found to have multifocal hemorrhagic contusions within the inferomedial frontal lobes, scattered subarachnoid hemorrhage, and a nondisplaced left mastoid temporal bone fracture with middle ear and mastoid effusion. Pt did not require neurosurgical intervention during hospital stay. Patient discharged to [facility] ARU. Patient continues to c/o headache. Plan:  No emergent neurosurgical intervention indicated  Neurologic exams frequency: Q4H  For change in exam MUST contact neurosurgery team along with critical care or primary team  SAH:  - Follow up head CT stable when compared to 10/06/22 scan at [facility]. No further imaging unless there is a decline in neurologic  - Maintain SBP <160; If PRN med insufficient, then may start Nicardipine infusion  Cerebral edema:  - Keep HOB >30 degrees  - Keep Na WNL  - Continue Decadron taper per Dr. Su Bazan  - NO dextrose in IVF's or in IV drips  - If central venous access is needed please use subclavian vs femoral - No IJ as this can decrease venous return and worsen cerebral edema  Pain: Managed by medical team  Advance diet / activity per primary team  DVT Prophylaxis: SCD's & OK for chemoprophylaxis  Will sign off. Please call with any questions or decline in neurological status    DISPO: Dispo timing to be determined by primary team once patient is medically stable for discharge. Patient was discussed with Dr. Kylah Alfaro who agrees with above assessment and plan.      Electronically signed by: JULIANO Ng CNP, APRN-CNP, 10/10/2022 3:39 PM  [phone]

## 2022-10-10 NOTE — CONSULTS
[facility] Wound Ostomy Continence Nurse  Consult Note       NAME:  Renetta Way  MEDICAL RECORD NUMBER:  [id number]  AGE: 34 y.o. GENDER: female  : 1990  TODAY'S DATE:  10/10/2022    Subjective   Reason for WOCN Evaluation and Assessment: R Posterior Head - traumatic      Renetta Way is a 34 y.o. female referred by:   [] Physician  [x] Nursing  [] Other:     Wound Identification:  Wound Type: traumatic  Contributing Factors:  Fall    Wound History: Pt is a 34year old female with a PMH of MDD, ALMAS, and ADHD who presents to the ED via EMS after sustaining a 10 foot fall from her attic to her garage concrete floor. Patient had hit her head and there was blood present on the concrete floor. She was initially a GCS of 15 per EMS but deteriorated to GCS 10 en route and started vomiting. She was given hypertonic saline. In the ED, patient was confused but able to answer questions. Presented as a GCS 15. CT of the head revealed multifocal hemorrhagic contusions within the inferomedial frontal lobes, greater on the left. 2.  Scattered subarachnoid hemorrhage. 3.  Nondisplaced left mastoid temporal bone fracture with middle ear and mastoid effusion. Pt did not require neurosurgical intervention during hospital stay. Pt is now medically stable and ready for discharge. Current Wound Care Treatment:  R Posterior Head - antibiotic ointment    Patient Goal of Care:  [x] Wound Healing  [] Odor Control  [] Palliative Care  [] Pain Control   [] Other:         PAST MEDICAL HISTORY    History reviewed. No pertinent past medical history. PAST SURGICAL HISTORY    No past surgical history on file. FAMILY HISTORY    No family history on file. SOCIAL HISTORY         ALLERGIES    No Known Allergies    MEDICATIONS    No current facility-administered medications on file prior to encounter.      Current Outpatient Medications on File Prior to Encounter   Medication Sig Dispense Refill    losartan (COZAAR) 25 MG tablet Take 25 mg by mouth daily      loratadine (CLARITIN) 10 MG tablet Take 10 mg by mouth daily      sertraline (ZOLOFT) 25 MG tablet          Objective    BP (!) 145/94   Pulse 82   Temp 97.9 °F (36.6 °C) (Oral)   Resp 16   Ht 5' 3\" (1.6 m) Comment: 5 foot 3  Wt 272 lb 14.9 oz (123.8 kg)   SpO2 96%   BMI 48.35 kg/m²     LABS:  WBC:    Lab Results   Component Value Date/Time    WBC 10.5 10/10/2022 06:41 AM     H/H:    Lab Results   Component Value Date/Time    HGB 12.7 10/10/2022 06:41 AM    HCT 37.7 10/10/2022 06:41 AM     PTT:  No results found for: APTT, PTT[APTT}  PT/INR:  No results found for: PROTIME, INR  HgBA1c:  No results found for: LABA1C    Assessment: R Posterior Head - small open area with red wound bed. Isaias Risk Score: Isaias Scale Score: 18    Patient Active Problem List   Diagnosis Code    SAH [facility]arachnoid hemorrhage) ([facility] I60.9       Measurements:  Wound 10/07/22 Head Right;Upper;Posterior (Active)   Wound Image   10/10/22 1552   Wound Etiology Traumatic 10/10/22 1552   Dressing Status New drainage noted 10/10/22 1552   Wound Cleansed Not Cleansed 10/10/22 1552   Dressing/Treatment Pharmaceutical agent (see MAR); Open to air 10/10/22 1552   Dressing Change Due 10/10/22 10/10/22 1552   Wound Length (cm) 0.3 cm 10/10/22 1552   Wound Width (cm) 0.3 cm 10/10/22 1552   Wound Depth (cm) 0.1 cm 10/10/22 1552   Wound Surface Area (cm^2) 0.09 cm^2 10/10/22 1552   Wound Volume (cm^3) 0.009 cm^3 10/10/22 1552   Wound Assessment Pink/red 10/10/22 1552   Drainage Amount Scant 10/10/22 1552   Drainage Description Serosanguinous 10/10/22 1552   Odor None 10/10/22 1552   Chery-wound Assessment Dry/flaky; Intact 10/10/22 1552   Margins Attached edges; Defined edges 10/10/22 1552   Wound Thickness Description not for Pressure Injury Partial thickness 10/10/22 1552   Number of days: 3   R Posterior Head:      Response to treatment:  Well tolerated by patient.      Pain Assessment:  Severity:  0 / 10  Quality of pain: N/A  Wound Pain Timing/Severity: none  Premedicated: No    Plan   Plan of Care: Wound 10/07/22 Head Right;Upper;Posterior-Dressing/Treatment: Pharmaceutical agent (see MAR), Open to air (Polysporin)  Recommendation: R Posterior Head - apply antibiotic ointment BID  Head - wash hair and try to remove mats of hair, may have to cut them out  Call Wound Care for deterioration [phone]    Specialty Bed Required : N/A   [] Low Air Loss   [] Pressure Redistribution  [] Fluid Immersion  [] Bariatric  [] Total Pressure Relief  [] Other:     Current Diet: ADULT DIET;  Regular  Dietician consult:  Yes    Discharge Plan:  Placement for patient upon discharge: home with support    Patient appropriate for Outpatient [address]: No    Referrals:  [x]   [] [facility]  [] Supplies  [] Other    Patient/Caregiver Teaching:  Level of patient/caregiver understanding able to:   [] Indicates understanding       [] Needs reinforcement  [] Unsuccessful      [] Verbal Understanding  [] Demonstrated understanding       [] No evidence of learning  [] Refused teaching         [] N/A       Electronically signed by Maine Medina RN, CWOCN on 10/10/2022 at 3:55 PM

## 2022-10-10 NOTE — PROGRESS NOTES
Occupational Therapy  Facility/Department: [facility] ACUTE REHAB UNIT  Rehabilitation Occupational Therapy Daily Treatment Note    Date: 10/10/22  Patient Name: Rosi Thompson       Room: 3112/3112-01  MRN: [id number]  Account: [de-identified]   : 1990  (27 y.o.) Gender: female                    Past Medical History:  has no past medical history on file. Past Surgical History:   has no past surgical history on file. Restrictions  Other position/activity restrictions: up with assistance, patient may shower    Subjective  Subjective: Pt semi supine in bed upon arrival, pleasant and agreeable to OT session to wash hair. Objective     Cognition  Arousal/Alertness: Appropriate responses to stimuli  Following Commands: Follows all commands without difficulty  Attention Span: Attends with cues to redirect  Memory: Appears intact  Safety Judgement: Decreased awareness of need for assistance;Decreased awareness of need for safety  Problem Solving: Assistance required to implement solutions;Assistance required to correct errors made  Insights: Decreased awareness of deficits  Initiation: Does not require cues  Sequencing: Does not require cues         ADL  Feeding  Assistance Level:  Independent  Skilled Clinical Factors: lunch tray brought in at [facility]  Grooming/Oral Hygiene  Skilled Clinical Factors: pt declined combing hair due to HA and fear of pulling causing more pain  Upper Extremity Bathing  Assistance Level: Supervision  Skilled Clinical Factors: seated on TTB, ++time to wash hair due to presence of blood  Lower Extremity Bathing  Assistance Level: Contact guard assist;Increased time to complete;Verbal cues  Skilled Clinical Factors: seated on TTB to wash front and rear periarea; in stance to dry buttocks with CGA; decreased thoroughness due to HA, occasional seated rest breaks due to \"feeling off balance\"  Upper Extremity Dressing  Assistance Level: Set-up  Skilled Clinical Factors: to don gown  Lower Extremity Dressing  Assistance Level: Contact guard assist;Set-up  Skilled Clinical Factors: pt donned underwear seated on toilet, SBA in stance to manage over hips without UE support  Putting On/Taking Off Footwear  Assistance Level: Supervision;Set-up  Skilled Clinical Factors: pt donned/doffed slip on sandals seated EOB and on toilet  Toileting  Assistance Level: Stand by assist  Skilled Clinical Factors: pt continent of bowel and bladder req + time to have BM, perihygiene seated, LB clothing mgmt on hips in stance with SBA  Toilet Transfers  Technique:  (ambulatory without AD)  Equipment: Standard toilet  Assistance Level: Contact guard assist  Tub/Shower Transfers  Type: Shower  Transfer From:  (no AD)  Transfer To: Tub transfer bench  Assistance Level: Stand by assist          Functional Mobility  Device:  (no AD)  Activity: To/From bathroom  Assistance Level: Contact guard assist  Supine to Sit  Assistance Level: Supervision  Skilled Clinical Factors: HOB slightly elevated  Sit to Stand  Assistance Level: Stand by assist  Stand to Sit  Assistance Level: Stand by assist       OT Exercises  Dynamic Standing Balance Exercises:  In order to improve self righting reactions to challenge dynamic standing balance pt completed the following standing activities: 1) without UE support pt tapped 4 different colored cones based on provided sequence, pt completed with CGA and no overt LOB with 90% accuracy; 2) pt completed standing BUE therex using 3# dowel nelda to complete the following exercises: 2 x 10 elbow flexion with simultaneous fwd taps, 2 x 10 chest press with BLE lateral taps, x 10 shoulder flexion and in attempt to grade activity pt completed x 2 shoulder flexion with squats but demo posterior LOB req min A to correct, pt declined further attempts due to HA and feeling disoriented; pt completed all other exercsises with CGA     Assessment  Assessment  Assessment: Pt demonstrated improved participation in bathing this date but continues to have decreased thoroughness due to severe pain in head which significantly limits patient's performance. Pt completed dynamic standing balance activities with multi-steps with mostly CGA but 2 instances req min A due to posterior LOB. Pt concerned about her ability to successfully care for her child and is willing to accept assistance from nursing care upon d/c. Pt functioning significantly below baseline, cont OT per POC. Activity Tolerance: Patient limited by fatigue;Patient limited by pain; Patient limited by endurance  Discharge Recommendations: Outpatient OT;24 hour supervision or assist  OT Equipment Recommendations  Other: continued assessment; may need TTB  Safety Devices  Safety Devices in place: Yes  Type of devices: Left in chair;Nurse notified;Call light within reach; Chair alarm in place    Patient Education  Education  Education Given To: Patient  Education Provided: Role of Therapy;Plan of Care;Transfer Training;ADL Function; Safety;Cognition  Education Provided Comments: extensive education on pt concerns with caring for her 9 month old upon d/c and importance of accepting help; chair-bed schedule with emphasis on importance of remaining out of bed for improved recovery and functional outcomes  Education Method: Demonstration;Verbal  Barriers to Learning: Cognition  Education Outcome: Verbalized understanding;Continued education needed    Plan  Occupational Therapy Plan  Times Per Week: 5x/wk x60 min  Current Treatment Recommendations: Strengthening;Balance training;Functional mobility training; Endurance training;Neuromuscular re-education;Self-Care / ADL; Home management training; Safety education & training;Patient/Caregiver education & training;Equipment evaluation, education, & procurement    Goals  Short Term Goals  Time Frame for Short Term Goals: 2 weeks-all ongoing  Short Term Goal 1: Patient will complete LB dressing with MOD I  Short Term Goal 2: Patient will complete bathing with MOD I  Short Term Goal 3: Patient will complete UB dressing at independent level  Short Term Goal 4: Patient will complete functional transfers, including commode transfer and functional transfers, with MOD I  Short Term Goal 5: Patient will complete simple kitchen task with SPVN    AM-PAC Score               Therapy Time   Individual Concurrent Group Co-treatment   Time In 1100         Time Out 1200         Minutes 60         Timed Code Treatment Minutes: [address] [facility], OT

## 2022-10-10 NOTE — PROGRESS NOTES
Pt in bed, eyes closed but easily aroused. BP elevated, other VSS. Alert & oriented x 3. Complains of her head hurting. Pt said eating helps her feel better. Pt began to eat her cold dinner which nurse offered to heat up and patient declined. Pt completed her dinner plate and patient said she felt better. Assessment completed. Nighttime medications given, pt tolerated well. Reminded pt to call for assistance with any needs. Call light within reach. Safety measures in place.

## 2022-10-10 NOTE — CARE COORDINATION
Case Management Assessment  Initial Evaluation    Date/Time of Evaluation: 10/10/2022 3:56 PM  Assessment Completed by: CHAPO Augustin    If patient is discharged prior to next notation, then this note serves as note for discharge by case management. Patient Name: Helena Mott                   YOB: 1990  Diagnosis: SAH (subarachnoid hemorrhage) ([facility] [address] [I60.9]                   Date / Time: 10/7/2022 12:52 PM    Patient Admission Status: REHAB IP   Readmission Risk (Low < 19, Mod (19-27), High > 27): Readmission Risk Score: 6.7    Current PCP: PROVIDER UNKNOWN, MD    Chart Reviewed: yes      History Provided by: Pt  Patient Orientation: a & O x 4      Hospitalization in the last 30 days (Readmission)    If yes, Readmission Assessment in  Navigator will be completed.     Advance Directives:      Code Status: Full Code   Patient's Primary Decision Maker is:        Discharge Planning:    Patient lives with: spouse Type of Home  Primary Care Giver  Patient Support Systems include: parents  Current Financial resources:   Current community resources:   Current services prior to admission: none            Current DME: none            Type of Home Care services:  none    ADLS  Prior functional level: independent  Current functional level:     PT AM-PAC:   /24  OT AM-PAC:   /24    Family can provide assistance at DC: spouse; parents  Plans to Return to Present Housing: yes  Other Identified Issues/Barriers to RETURNING to current housing: Pt concern with infant son, Flor Machado, who is at [address] [place] [address] needed at discharge:             Potential DME: TBD  Patient expects to discharge to: home  Plan for transportation at discharge: spouse    Financial    Payor: Olga Lidia Pinzon / Plan: Santy / Product Type: *No Product type* /     Does insurance require precert for SNF: Yes    Potential assistance Purchasing Medications:   Meds-to-Beds request: no      [facility] [id number] - [address]  [address] [place] [address]  Phone: [phone] Fax: [phone]      Notes: Additional Case Management Notes  SW met with Pt at bedside this afternoon. Introduced self and role of SW. We talked mostly of Pt's frustration of not being with her 9 month old son, Roxann Angela, who is at [facility], and that she hopes to be DC from ARU soon so that she can be with Roxann Angela. Pt states her spouse is on Paternity Leave now because Roxann Angela was to come home from the hospital but due to Insurance issues, these plans had to be postponed. Emotional support provided. SW will follow up with Pt tomorrow after Team Conference and advise of DC date and plan. The Plan for Transition of Care is related to the following treatment goals of SAH (subarachnoid hemorrhage) ([facility] [address] [K22.8]    IF APPLICABLE: The Patient and/or patient representative Deshawn Angela and her family were provided with a choice of provider and agrees with the discharge plan.  Freedom of choice list with basic dialogue that supports the patient's individualized plan of care/goals and shares the quality data associated with the providers was provided to:     Patient Representative Name:       The Patient and/or Patient Representative Agree with the Discharge Plan? yes    Juanpablo Amaro [place]  Case Management Department  Ph: [phone]

## 2022-10-10 NOTE — PROGRESS NOTES
Physical Therapy  Facility/Department: [facility] ACUTE REHAB UNIT  Rehabilitation Physical Therapy Treatment Note    NAME: Romana Sine  : 1990 (34 y.o.)  MRN: [id number]  CODE STATUS: Full Code    Date of Service: 10/10/22       Restrictions:  Restrictions/Precautions: Fall Risk;Seizure  Position Activity Restriction  Other position/activity restrictions: up with assistance, patient may shower     SUBJECTIVE  Subjective  Subjective: Pt sitting in recliner, pumping and crying request therapy comes back in 30 minutes. 30 minutes later PCA walking out of the room, had just put her in the bed cause she was tired. But agreeabe to therapy. Pain: no c/o of pain        Post Treatment Pain Screening         OBJECTIVE  Orientation  Overall Orientation Status: Within Normal Limits  Orientation Level: Oriented X4    Functional Mobility  Sit to Supine  Assistance Level: Stand by assist  Supine to Sit  Assistance Level: Stand by assist  Transfers  Surface: To chair with arms;From bed;From chair with arms  Sit to Stand  Assistance Level: Contact guard assist  Skilled Clinical Factors: VC for hand placement  Stand to Sit  Assistance Level: Stand by assist      Environmental Mobility  Ambulation  Surface: Level surface  Device: Rolling walker  Distance: 95'x2+180'  Additional Factors: Verbal cues; Increased time to complete  Assistance Level: Contact guard assist  Gait Deviations: Slow jaqui; Path deviations;Narrow base of support; Unsteady gait  Skilled Clinical Factors: swaying side to side ambulation, needs rest breaks due to unbalanced feeling, and states \"my left ear feels like it is moving and I cannot hear out of it\" pt did get emotional.             PT Exercises  Circulation/Endurance Exercises: SCIFIT LVL1 5 minutes (reports dizziness, needing mulitple minute rest breaks)  Dynamic Sitting Balance Exercises: double, single and tandem 3x minute each position with no UE support      ASSESSMENT/PROGRESS TOWARDS GOALS Assessment  Assessment: Pt continues to demo below baseline for all functional mobility, ambulation and safety awareness. Pt requires CGA for all ambualtion due to balance impairements. Pt is signficantly limited during the session due to emotions, endurance and strength. Pt was challenged by endurance activities and balance exercises. She was able to ambulate in hallway, but at times needing standing rest breaks in order to regain balance. Pt would benefit from continued PT in order to improve her balance, functional mobility and safety awareness. Activity Tolerance: Patient limited by fatigue;Patient limited by pain; Patient limited by endurance;Treatment limited secondary to medical complications  Discharge Recommendations: Home with assist PRN;Home with Home health PT;Continue to assess pending progress  PT Equipment Recommendations  Other: Ongoing assessment    Goals  Patient Goals   Patient Goals : \"To get out of here \"  Short Term Goals  Time Frame for Short Term Goals: 2 weeks  Short Term Goal 1: Ind with bed mobility  Short Term Goal 2: Ind with transf excluding floor transf  Short Term Goal 3: Ind with amb on level surfaces distances > 250' at a time  Short Term Goal 4: MI with amb up and down 12 steps ( used as an endurnace goal)    PLAN OF CARE/SAFETY  Physcial Therapy Plan  Days Per Week: 5 Days  Hours Per Day: 1 hour  Current Treatment Recommendations: Balance training;Strengthening; Functional mobility training;Transfer training; Endurance training;Gait training;Stair training;Neuromuscular re-education; Safety education & training;Patient/Caregiver education & training;Equipment evaluation, education, & procurement; Therapeutic activities  Safety Devices  Type of Devices: All fall risk precautions in place; Chair alarm in place;Call light within reach; Patient at risk for falls; Left in chair;Nurse notified    EDUCATION  Education  Education Given To: Patient  Education Provided: Role of Therapy;Plan of Care;Home Exercise Program;Safety; Mobility Training;Transfer Training;Energy Conservation;Cognition; Family Education; Fall Prevention Strategies  Education Provided Comments: Pt educated on pumping schedule, alarms put in her phone  Education Method: Verbal  Barriers to Learning: None  Education Outcome: Verbalized understanding;Continued education needed        Therapy Time   Individual Concurrent Group Co-treatment   Time In 1315         Time Out 1415         Minutes 60         Timed Code Treatment Minutes:   60    Total Treatment Minutes:  [address] [place], [place] #[id number]

## 2022-10-10 NOTE — PROGRESS NOTES
Department of Physical Medicine & Rehabilitation  Progress Note    Patient Identification:  Ever Gould  [id number]  : 1990  Admit date: 10/7/2022    Chief Complaint: SAH (subarachnoid hemorrhage) ([address]    Subjective:   No acute events overnight. Patient seen this am. She continues to have headaches and required more oxycodone overnight. She continues to breast pump. Plan to work on mobility in therapy today. Slowly showing some improvement. ROS: No f/c, n/v, cp     Objective:  Patient Vitals for the past 24 hrs:   BP Temp Temp src Pulse Resp SpO2   10/10/22 1006 -- -- -- -- 16 --   10/10/22 0800 (!) 145/94 97.9 °F (36.6 °C) Oral 82 20 --   10/10/22 0244 -- -- -- -- 17 --   10/09/22 2130 (!) 142/94 98.4 °F (36.9 °C) Oral 88 18 96 %     Const: Alert. No distress, pleasant. HEENT: Normocephalic, atraumatic. Normal sclera/conjunctiva. MMM. CV: Regular rate and rhythm. Resp: No respiratory distress. Lungs CTAB. Abd: Soft, nontender, nondistended, NABS+   Ext: No edema. Neuro: Alert, oriented, appropriately interactive. Psych: Cooperative, appropriate mood and affect    Laboratory data: Available via EMR.    Last 24 hour lab  Recent Results (from the past 24 hour(s))   POCT Glucose    Collection Time: 10/09/22 11:53 AM   Result Value Ref Range    POC Glucose 97 70 - 99 mg/dl    Performed on ACCU-CHEK    POCT Glucose    Collection Time: 10/09/22  5:21 PM   Result Value Ref Range    POC Glucose 113 (H) 70 - 99 mg/dl    Performed on ACCU-CHEK    POCT Glucose    Collection Time: 10/09/22  9:51 PM   Result Value Ref Range    POC Glucose 107 (H) 70 - 99 mg/dl    Performed on ACCU-CHEK    Basic Metabolic Panel w/ Reflex to MG    Collection Time: 10/10/22  6:41 AM   Result Value Ref Range    Sodium 133 (L) 136 - 145 mmol/L    Potassium reflex Magnesium 3.5 3.5 - 5.1 mmol/L    Chloride 95 (L) 99 - 110 mmol/L    CO2 27 21 - 32 mmol/L    Anion Gap 11 3 - 16    Glucose 120 (H) 70 - 99 mg/dL    BUN 14 7 - 20 mg/dL    Creatinine 0.6 0.6 - 1.1 mg/dL    GFR Non-African American >60 >60    GFR African American >60 >60    Calcium 9.4 8.3 - 10.6 mg/dL   CBC auto differential    Collection Time: 10/10/22  6:41 AM   Result Value Ref Range    WBC 10.5 4.0 - 11.0 K/uL    RBC 4.59 4.00 - 5.20 M/uL    Hemoglobin 12.7 12.0 - 16.0 g/dL    Hematocrit 37.7 36.0 - 48.0 %    MCV 82.1 80.0 - 100.0 fL    MCH 27.7 26.0 - 34.0 pg    MCHC 33.8 31.0 - 36.0 g/dL    RDW 14.3 12.4 - 15.4 %    Platelets 507 218 - 462 K/uL    MPV 7.8 5.0 - 10.5 fL    Neutrophils % 70.5 %    Lymphocytes % 22.5 %    Monocytes % 5.3 %    Eosinophils % 1.0 %    Basophils % 0.7 %    Neutrophils Absolute 7.4 1.7 - 7.7 K/uL    Lymphocytes Absolute 2.4 1.0 - 5.1 K/uL    Monocytes Absolute 0.6 0.0 - 1.3 K/uL    Eosinophils Absolute 0.1 0.0 - 0.6 K/uL    Basophils Absolute 0.1 0.0 - 0.2 K/uL   Magnesium    Collection Time: 10/10/22  6:41 AM   Result Value Ref Range    Magnesium 2.00 1.80 - 2.40 mg/dL   POCT Glucose    Collection Time: 10/10/22  8:05 AM   Result Value Ref Range    POC Glucose 123 (H) 70 - 99 mg/dl    Performed on ACCU-CHEK        Therapy progress:  PT  Position Activity Restriction  Other position/activity restrictions: up with assistance, patient may shower  Objective     Sit to Stand: Contact guard assistance (VC for hand placement . Pt unsteady with transition req CGA and use of the RW to be stable)  Stand to Sit: Contact guard assistance (Decreased eccentric control with increased urgency when pt reported that she was \"going to be sick\")  Bed to Chair: Contact guard assistance (Used the RW and performed stepping with walker to get to the chair)  Device: [address]: Contact guard assistance, Minimal assistance  Distance: 20'  OT  PT Equipment Recommendations  Other: Ongoing assessment  Toilet Transfers Comments: unable to assess d/t time constraints  Assessment        SLP          Body mass index is 48.35 kg/m².     Assessment and Plan:  #Multifocal hemorrhagic contusions   Scattered SAH   Superior enplate deformities of T11 & T12,indeterminat   -No neurosurgical intervention indicated at this time   -Keppra x 7 days for seizure prophylaxis   -Upright x-rays reviewed, no brace needed for T11/T12 SEP deformities;activity as tolerated   -Heparin gtt for DVST -HCT when therapeutic   -CTA outpatient  -PT/OT/SLP  -Pain management: PRN Tylenol, low dose Oxycodone      #L mastoid temporal bone fracture with middle ear and mastoid effusion  #Diastases of the left occipital mastoid suture.  Fracture involving the left sigmoid plate with pneumocephalus.   - CT max/face 10/3 with L occipital mastoid suture diastases and L sigmoid plate fracture with pneumocephalus   - Gelfoam (dissolves, not requiring removal) placed in L EAC, please apply Ciprodex 4 drops BID x 7 days - 2 days left   - Outpatient audiogram in 1-2 weeks with ENT follow up                                   # Hx of Diabetes (Metformin at home)    -No acute issues   - SSI  - metformin low dose                    # Obesity   -counseling                       #DVST    partially occlusive thrombus within left sigmoid and transverse sinuses   -Lovenox     Rehab Progress: Improving  Anticipated Dispo: home  Services/DME: HARRISON  ELOS: HARRISON Gutierrez D.O. M.P.H  PM&R  10/10/2022  11:15 AM

## 2022-10-10 NOTE — PLAN OF CARE
Problem: Discharge Planning  Goal: Discharge to home or other facility with appropriate resources  Flowsheets (Taken 10/10/2022 0048)  Discharge to home or other facility with appropriate resources:   Identify barriers to discharge with patient and caregiver   Identify discharge learning needs (meds, wound care, etc)     Problem: Skin/Tissue Integrity  Goal: Absence of new skin breakdown  Description: 1.   Monitor for areas of redness and/or skin breakdown  Outcome: Progressing     Problem: Pain  Goal: Verbalizes/displays adequate comfort level or baseline comfort level  Outcome: Progressing  Flowsheets (Taken 10/10/2022 0048)  Verbalizes/displays adequate comfort level or baseline comfort level:   Encourage patient to monitor pain and request assistance   Assess pain using appropriate pain scale   Administer analgesics based on type and severity of pain and evaluate response   Implement non-pharmacological measures as appropriate and evaluate response     Problem: ABCDS Injury Assessment  Goal: Absence of physical injury  Outcome: Progressing  Flowsheets (Taken 10/10/2022 0048)  Absence of Physical Injury: Implement safety measures based on patient assessment

## 2022-10-10 NOTE — PROGRESS NOTES
ACUTE REHAB UNIT  SPEECH/LANGUAGE PATHOLOGY      [x] Daily  [] Weekly Care Conference Note  [] Discharge    Fior July      :1990  JR  Rehab Dx/Hx: SAH (subarachnoid hemorrhage) ([facility] [address] [I60.9]    Precautions: [] Aspiration  [x] Fall risk  [] Sternal  [x] Seizure [] Hip  [] Weight Bearing [] Other  ST Dx: [] Aphasia  [] Dysarthria  [] Apraxia   [] Oropharyngeal dysphagia [] Cognitive Impairment  [] Other:   Date of Admit: 10/7/2022  Room #: 1194/1044-13  Date: 10/10/2022          Current Diet Order:ADULT DIET; Regular      Lives With: Spouse (10 mo son, who is currently still hospitalized at [facility]  Homemaking Responsibilities: Yes  Education: bachelor's degree  Bill Paying/Finance Responsibility: Primary (online)  Health Care Management: Primary (out of the bottle)  Type of Occupation: previously worked full time in [employer], 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 Leisure & Hobbies: reading, walking, spending time with [employer], playing with animals (3 cats, dog)     Vision  Vision: Within Functional Limits  Hearing  Hearing: Within functional limits  Barriers toward progress: Pain, appropriately tearful     Date: 10/10/2022      Tx session 1 Tx session 2   Total Timed Code Min 50 20   Total Treatment Minutes 50 20   Individual Treatment Minutes 50 20   Group Treatment Minutes 0 0   Co-Treat Minutes 0 0   Brief Exception: Pt was toileting at start of session. None. Pain Endorsing pain as 9/10; RN aware and already medicated. The pt agreeable to ice pack at end of session. Grimacing throughout session with pt endorsing feeling generally awful. Endorsing ongoing pain / minimal to no improvement. Reports not due for meds. The pt was able to participate without grimacing except for during repositioning.     Pain Intervention: [] RN notified  [] Repositioned  [] Intervention offered and patient declined  [x] N/A  [] Other: [] RN notified  [] Repositioned  [] Intervention offered and patient declined  [x] N/A  [] Other:   Subjective:     Pt partially reclined in bed and agreeable to therapy session. Pt partially reclined in bed and agreeable to session. Visitor arrived during session but did not reciprocate an introduction. Objective / Goals:     Pt will complete graded tasks with adequate executive function skills with 90% accuracy independently. See below. Pt will initiate responses <5 seconds across 3 sessions. 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 Pt admits \"I'm not keeping up with all of that right now\" which appears to be appropriate prioritizing of cognitive demands at this time. Pt will demonstrate planning and organization during graded tasks with 90% accuracy independently. Faux schedule generation from passage: Min cues. Pt will tolerate ongoing cognitive-linguistic assessment as clinically indicated. Goals established re: lactation and pt was not planning to wean and does want to continue to express breastmilk during hospitalization but continues to demo reduced execution in line with reported goals. Completed cognitive behavioral exploration and pt able to identify barriers related to having supplies at bedside and then just execution. Removed location barriers and created schedule in conjunction with pts wishes/scheduling and with team.  Discussed goal of recovery from brain injury and needed increase of OOB activity. Developed goal of 2 hrs up and 2 hrs return to bed during day. Plan is for pt to set 2 hr alarm after she is up to chair and there is a reminder on board for pt to set her own timer but suspect she will need reminder due to executive dysfunction.     Other areas targeted: Patient was administered portions of the DANIELA (Assessment of Language-Related Functional Activities) with the following results:  Counting Money: 80% accuracy  Understanding Medication Labels:  90% accuracy    Education:   Education as above. Education on activities/options for outside of therapy and pt agreeable. Safety Devices: [x] Call light within reach  [] Chair alarm activated and connected to nurse call light system  [x] Bed alarm activated   [] Other: [x] Call light within reach  [] Chair alarm activated and connected to nurse call light system  [x] Bed alarm activated  [] Other:    Assessment: Significant grieving related to brain injury compounded by baseline high stressors (son with special needs who resides in hospital s/p transplant, lactation demands, etc). Pt presents with executive dysfunction and significantly increased impulsivity in PM. Pt is motivated to improve and target cognitive impairments. Pt reports minimal same day retrograde amnesia with only what is suspected to be minutes and few days anterograde amnesia. Mild difficulty progressing through tasks due to lability and pain. Plan: Continue as per plan of care.       Interventions used this date:  [] Speech/Language Treatment  [] Instruction in HEP  [] Dysphagia Treatment [x] Cognitive Treatment   [] Other:    Discharge recommendations:  [] Home independently  [x] Home with assistance []  24 hour supervision  [] ECF [] Other  Continued Tx Upon Discharge: ? [] Yes    [] No    [x] TBD based on progress while on ARU     [] Vital Stim indicated     [] Other:   Estimated discharge date: Not yet established    Electronically signed by  Jose Cuadra M.A., [place]  Speech-Language Pathologist

## 2022-10-10 NOTE — PATIENT CARE CONFERENCE
Inpatient Rehabilitation  Weekly Team Conference Note  The [address]  [phone]  Patient Name: Gayathri Quach        MRN: [id number]    : 1990  (34 y.o.)  Gender: female   Referring Practitioner: Dr. Brennan Josue  Diagnosis: Debility 2/2 to [facility]  The team conference for this patient was held on 10/11/2022 at 10:30am by:  Ronal More.  Joselo, DO    Current/Goal QM SCORES  QM Current/Goal Score   Eating CARE Score: 6 /  Discharge Goal: Independent   Oral Hygiene CARE Score: 7 / Discharge Goal: Independent   Shower/Bathing CARE Score: 4 / Discharge Goal: Independent   UB Dressing CARE Score: 4 / Discharge Goal: Independent   LB Dressing CARE Score: 4 / Discharge Goal: Independent   Putting on/off Footwear CARE Score: 4 / Discharge Goal: Independent   Toileting Hygiene CARE Score: 4 / Discharge Goal: Independent   Bladder Continence Bladder Continence: Always continent    Bowel Continence Bowel Continence: Always continent    Toilet Transfers CARE Score: 4 / Discharge Goal: Independent   Shower/Bathe Self  CARE Score: 4 / Discharge Goal: Independent   Rolling Left and Right CARE Score: 6 / Discharge Goal: Independent   Sit to Lying CARE Score: 4 / Discharge Goal: Independent   Lying to Sitting on Bedside CARE Score: 4 / Discharge Goal: Independent   Sit to Stand CARE Score: 4 / Discharge Goal: Independent   Chair/Bed to Chair Transfer CARE Score: 1 / Discharge Goal: Independent   Car Transfers CARE Score: 88 / Discharge Goal: Independent   Walk 10 Feet CARE Score: 4 / Discharge Goal: Independent   Walk 50 Feet with Two Turns CARE Score: 4 / Discharge Goal: Independent   Walk 150 Feet CARE Score: 4 / Discharge Goal: Independent   Walk 10 Feet on Uneven Surfaces CARE Score: 88 /     1 Step (Curb) CARE Score: 88 /     4 Steps CARE Score: 88 / Discharge Goal: Independent   12 Steps CARE Score: 88 / Discharge Goal: Independent   Picking up Object from Floor CARE Score: 80 / Discharge Goal: Independent     NURSING:  A&Ox: Level of Consciousness: Alert (0)  Orientation Level: Oriented X4, Oriented to situation, Oriented to time, Oriented to place, Oriented to person  [place] Risk Score: Juan Total Score: 50  Admission BIMS: 15  Wounds/Incisions/Ulcers: Laceration to right anterior head,scattered abrasion,  scattered Ecchymosis   Medication Review: Yes, with pt. And family  Pain: Headache not controlled-PRN  Fioricet, PRN Tylenol PRN OXY-IR  Consultations: Inpatient consult to Psychology  Imaging: See Below   CT HEAD WO CONTRAST   Final Result   1. Large anterior frontal lobe hemorrhagic contusions, left greater than right, with associated edema. 2.  Small anterior left temporal lobe hemorrhagic contusion. 3.  Trace amount of subdural hematoma anteriorly along the falx. Small left cerebral convexity and tentorial subdural hematoma. 4.  2-3 mm of left-to-right anterior subfalcine shift. 5.  No hydrocephalus or descending transtentorial herniation. Active Comorbid Conditions:Depression  Systems Review:   Renal: W, Dialysis:  Type: N/A, Frequency: N/A  Neurological: X  Musculoskeletal: X  Respiratory: W  Cardiac/Circulatory/Peripheral Vascular: W  Abnormal/Relevant Test Results: See chart below  Abnormal/Relevant Lab Values:   CBC:   Recent Labs     10/10/22  0641   WBC 10.5   HGB 12.7   HCT 37.7   MCV 82.1        BMP:   Recent Labs     10/10/22  0641   *   K 3.5   CL 95*   CO2 27   BUN 14   CREATININE 0.6     PT/INR: No results for input(s): PROTIME, INR in the last 72 hours. APTT: No results for input(s): APTT in the last 72 hours.   Liver Profile:  No results found for: AST, ALT, ALB, BILIDIR, BILITOT, ALKPHOS, GGT, 5NUCNo results found for: CHOL, HDL, TRIG  Recent Labs     10/10/22  0641   WBC 10.5   HGB 12.7   HCT 37.7      MCV 82.1     Recent Labs     10/10/22  0641   *   K 3.5   CL 95*   CO2 27   BUN 14 CREATININE 0.6     No results for input(s): AST, ALT, ALB, BILIDIR, BILITOT, ALKPHOS in the last 72 hours. Recent Labs     10/10/22  0641   MG 2.00     Recent Labs     10/10/22  0641   WBC 10.5   HGB 12.7   HCT 37.7        Recent Labs     10/10/22  0641   *   K 3.5   CL 95*   CO2 27   BUN 14   CREATININE 0.6   CALCIUM 9.4   No results for input(s): AST, ALT, BILIDIR, BILITOT, ALKPHOS in the last 72 hours. No results for input(s): INR in the last 72 hours. No results for input(s): Corky Stallion in the last 72 hours. PHYSICAL THERAPY:  Bed Mobility: Scooting: Supervision (unable to david caudal <>cephalo scooting 2/2 the intensity of HA and LBP)    Transfers:  Sit to Stand: Contact guard assistance (VC for hand placement . Pt unsteady with transition req CGA and use of the RW to be stable)  Stand to Sit: Contact guard assistance (Decreased eccentric control with increased urgency when pt reported that she was \"going to be sick\")  Bed to Chair: Contact guard assistance (Used the RW and performed stepping with walker to get to the chair)    WB Status: No WB status restrictions noted in chart  Ambulation  Surface: Level tile  Device: Rolling Walker  Assistance: Contact guard assistance, Minimal assistance  Quality of Gait: Asymmetrical step length, difficulty with managing the walker,  Gait Deviations: Staggers  Distance: 20'  More Ambulation?: No    OCCUPATIONAL THERAPY:  ADL  Grooming Skilled Clinical Factors: Total assist to comb hair d/t worsening headache with movement, unable to tolerate assessment of other tasks this date  UE Bathing Skilled Clinical Factors: Patient refused to personally attempt d/t severity of headache, not attempted but would require total assist d/t pain.   LE Bathing: Dependent/Total  UE Dressing: Setup  UE Dressing Skilled Clinical Factors: to don hospital gown only; street clothes not available  LE Dressing: Unable to assess(comment)  LE Dressing Skilled Clinical Factors: unable to attempt briefs despite opportunity d/t worsening headache with prolonged sitting in shower. Total assist to don/doff socks    Toilet Transfers: Toilet Transfers  Toilet Transfers Comments: unable to assess d/t time constraints    Tub/ShowerTransfers:  Shower Transfers  Shower - Transfer From: Wheelchair  Shower - Transfer Type: To and From  Shower - Transfer To: Transfer tub bench  Shower - Technique: Stand pivot  Shower Transfers: Contact Guard    SPEECH THERAPY:  Assessment: Significant grieving related to brain injury compounded by baseline high stressors (son with special needs who resides in hospital s/p transplant, lactation demands, etc). Pt presents with executive dysfunction and significantly increased impulsivity in PM. Pt is motivated to improve and target cognitive impairments. NUTRITION:  Please see nutrition note for details. Weight: 272 lb 14.9 oz (123.8 kg) / Body mass index is 48.35 kg/m². Diet Level/Order: ADULT DIET; Regular  Supplements:   Average Consumption per meal: PO Meals Eaten (%): 76 - 100%    CASE MANAGEMENT:  Psychosocial/Behavioral Issues:   Assessment:  Pt is from home with spouse. Their 9 month old son is in [facility] and Pt is eager to be discharged to be with her son. Patient/Family Education provided by team:  Role of PT/OT, d/c planning, HEP, IADL safety    Patient Goals for Rehab stay:  1. Improve cognitive ability to develop and execute plans  2. Be independent with ADLs     Rehab Team Goals for patient for the Upcoming Week:  1. Pt will execute self generated schedule and expectations with mod I and min prompts  2. Increase independence with A/IADLs    Barriers to Progress/Attainment of Goals & Efforts/Interventions to remove Barriers:  1. Executive dysfunction - cognitive rehabilitation  2.  Decreased activity tolerance-continue OT/PT    Discharge Plan:  Estimated Length of Stay: 14 days  Destination: home health  Services at Discharge: [address] [place] [address] Physical Therapy, Occupational Therapy, and Speech Therapy 3x week  Equipment at Discharge: bath bench  Community Resources:   Factors facilitating achievement of predicted outcomes: Family support, Motivated, Cooperative, and Good insight into deficits  Barriers to the achievement of predicted outcomes: Pain, Cognitive deficit, Impulsivity, Limited safety awareness, Decreased motivation, Limited participation, Depression, Anxiety, Unrealistic expectations, and Decreased endurance    Special Needs in the Upcoming Week:   [x] Family/Caregiver Education  [] Home visit  []Therapeutic Pass [] Consults:_______   [] Family/Caregiver Training  [] Stroke Risk Factor Education     [] Other;_______     TEAM SUMMARY: Will continue with current poc & goals until anticipated d/c date of 10/15/2022.     MD:   Stroke Risk Factors:   [] N/A for this patient [] HTN  []  Diabetes  [] Hyperlipidemia  [x]Obesity BMI >25  [] Atrial Fibrillation [] Smoker (current)  [] Smoker (quit in last 12 months)  [] Sleep Apnea [] Other:     Risk for Readmission: Low 6%    Justification for Continued Stay:   Criteria for continued IRF stay:  Based on my medical assessment of the patient and review of information from the interdisciplinary team, as part of this weekly team conference, the patient continues to meet the following criteria for IRF level of care:  [x] The patient requires 24-hour rehabilitation nursing care   [x] The patient requires an intensive rehabilitation therapy program  [x] The patient requires active and ongoing intervention of multiple therapy disciplines  [x] The patient requires continued physician supervision by a rehabilitation physician  [x] The patient requires an intensive and coordinated interdisciplinary team approach to the delivery of rehabilitative care    Medical Necessity-continued close physician medical management is required for:   [] Cardiac/Circulatory dysfunction  [] Respiratory/Pulmonary dysfunction  [] Integumentary complications  [] Peripheral Vascular dysfunction  [] Musculoskeletal dysfunction  [x] Neurological dysfunction d/t:  [x] CVA  [] SCI  [] TBI  [] Other: __________  [] Renal dysfunction  [] Hematologic dysfunction    [] Endocrine disorders  [] GI disorders     [] Genito-Urinary dysfunction    Assessment/Plan:  [x] The patient is making good progression towards their LTG's, is actively participating in, and has a reasonable expectation to continue to benefit from the intensive rehabilitation program.  [] The estimated discharge date has been changed from initial team conference due to:   [] The estimated discharge destination has been changed from initial team conference due to:     Rehab Team Members in attendance for Team Conference:  ARU Supervisor/PPS Coordinator:  Adam Hall PT, DPT    Therapy Manager/ARU :  Daysi Arce, PT, DPT    Social Work:  Reyna Bryan [place]    Nursing: Stalin Perez, RN    Therapy:  Jenna Cast, PT  Lenora Palmer, PT, DPT  Griselda Sequeira PT, DPT     Sasha Marie, OTR/L  Chucho Hutchison, OTR/L    Maryann Lepe MA-CCC, SLP  Claudene Lennert, MA-CCC, SLP    I approve the established interdisciplinary plan of care as documented within the medical record of [facility].     MD Juancho Mccormack D.O. M.P.H  PM&R  10/11/2022  12:35 PM

## 2022-10-10 NOTE — PROGRESS NOTES
Patient educated that she needs to pump and dump per Pharmacy r/t Oxycodone. Patient is aware that pharmacy states pump/ dump must occur from 37 hours last dose of medications.  She is also aware that other medications may impact need for pump and dump too, but phamacy didn't investigate those medications since taking oxy indicates pump and dump

## 2022-10-11 LAB
GLUCOSE BLD-MCNC: 111 MG/DL (ref 70–99)
GLUCOSE BLD-MCNC: 112 MG/DL (ref 70–99)
GLUCOSE BLD-MCNC: 97 MG/DL (ref 70–99)
GLUCOSE BLD-MCNC: 99 MG/DL (ref 70–99)
PERFORMED ON: ABNORMAL
PERFORMED ON: ABNORMAL
PERFORMED ON: NORMAL
PERFORMED ON: NORMAL

## 2022-10-11 PROCEDURE — 97535 SELF CARE MNGMENT TRAINING: CPT

## 2022-10-11 PROCEDURE — 99233 SBSQ HOSP IP/OBS HIGH 50: CPT | Performed by: PHYSICAL MEDICINE & REHABILITATION

## 2022-10-11 PROCEDURE — 6370000000 HC RX 637 (ALT 250 FOR IP): Performed by: NURSE PRACTITIONER

## 2022-10-11 PROCEDURE — 97130 THER IVNTJ EA ADDL 15 MIN: CPT

## 2022-10-11 PROCEDURE — 97110 THERAPEUTIC EXERCISES: CPT | Performed by: PHYSICAL THERAPIST

## 2022-10-11 PROCEDURE — 6360000002 HC RX W HCPCS: Performed by: NEUROLOGICAL SURGERY

## 2022-10-11 PROCEDURE — 6370000000 HC RX 637 (ALT 250 FOR IP): Performed by: PHYSICAL MEDICINE & REHABILITATION

## 2022-10-11 PROCEDURE — 6360000002 HC RX W HCPCS: Performed by: PHYSICAL MEDICINE & REHABILITATION

## 2022-10-11 PROCEDURE — 1280000000 HC REHAB R&B

## 2022-10-11 PROCEDURE — 97129 THER IVNTJ 1ST 15 MIN: CPT

## 2022-10-11 PROCEDURE — 97530 THERAPEUTIC ACTIVITIES: CPT | Performed by: PHYSICAL THERAPIST

## 2022-10-11 PROCEDURE — 97530 THERAPEUTIC ACTIVITIES: CPT

## 2022-10-11 PROCEDURE — 97116 GAIT TRAINING THERAPY: CPT | Performed by: PHYSICAL THERAPIST

## 2022-10-11 RX ADMIN — Medication 5 MG: at 21:43

## 2022-10-11 RX ADMIN — BACITRACIN ZINC AND POLYMYXIN B SULFATE: 500; 10000 OINTMENT TOPICAL at 08:08

## 2022-10-11 RX ADMIN — DEXAMETHASONE 2 MG: 4 TABLET ORAL at 17:57

## 2022-10-11 RX ADMIN — BISACODYL 5 MG: 5 TABLET, COATED ORAL at 08:07

## 2022-10-11 RX ADMIN — SERTRALINE HYDROCHLORIDE 25 MG: 25 TABLET ORAL at 08:07

## 2022-10-11 RX ADMIN — BUTALBITAL, ACETAMINOPHEN, AND CAFFEINE 1 TABLET: 50; 325; 40 TABLET ORAL at 21:42

## 2022-10-11 RX ADMIN — DEXAMETHASONE 2 MG: 4 TABLET ORAL at 06:44

## 2022-10-11 RX ADMIN — Medication 400 MG: at 08:07

## 2022-10-11 RX ADMIN — ENOXAPARIN SODIUM 120 MG: 150 INJECTION SUBCUTANEOUS at 21:43

## 2022-10-11 RX ADMIN — ENOXAPARIN SODIUM 120 MG: 150 INJECTION SUBCUTANEOUS at 08:09

## 2022-10-11 RX ADMIN — PANTOPRAZOLE SODIUM 40 MG: 40 TABLET, DELAYED RELEASE ORAL at 06:44

## 2022-10-11 RX ADMIN — METFORMIN HYDROCHLORIDE 500 MG: 500 TABLET ORAL at 17:56

## 2022-10-11 RX ADMIN — METFORMIN HYDROCHLORIDE 500 MG: 500 TABLET ORAL at 08:07

## 2022-10-11 RX ADMIN — BUTALBITAL, ACETAMINOPHEN, AND CAFFEINE 1 TABLET: 50; 325; 40 TABLET ORAL at 09:28

## 2022-10-11 RX ADMIN — ONDANSETRON 4 MG: 4 TABLET, ORALLY DISINTEGRATING ORAL at 13:44

## 2022-10-11 RX ADMIN — LEVETIRACETAM 1000 MG: 500 TABLET, FILM COATED ORAL at 08:07

## 2022-10-11 RX ADMIN — BACITRACIN ZINC AND POLYMYXIN B SULFATE: 500; 10000 OINTMENT TOPICAL at 21:43

## 2022-10-11 ASSESSMENT — PAIN DESCRIPTION - DESCRIPTORS
DESCRIPTORS: ACHING

## 2022-10-11 ASSESSMENT — PAIN DESCRIPTION - FREQUENCY
FREQUENCY: CONTINUOUS

## 2022-10-11 ASSESSMENT — PAIN SCALES - GENERAL
PAINLEVEL_OUTOF10: 6
PAINLEVEL_OUTOF10: 6
PAINLEVEL_OUTOF10: 5
PAINLEVEL_OUTOF10: 6
PAINLEVEL_OUTOF10: 8
PAINLEVEL_OUTOF10: 7

## 2022-10-11 ASSESSMENT — PAIN DESCRIPTION - ONSET
ONSET: ON-GOING

## 2022-10-11 ASSESSMENT — PAIN DESCRIPTION - LOCATION
LOCATION: HEAD
LOCATION: HEAD

## 2022-10-11 ASSESSMENT — PAIN - FUNCTIONAL ASSESSMENT
PAIN_FUNCTIONAL_ASSESSMENT: ACTIVITIES ARE NOT PREVENTED

## 2022-10-11 ASSESSMENT — PAIN DESCRIPTION - ORIENTATION
ORIENTATION: RIGHT;LEFT;ANTERIOR
ORIENTATION: RIGHT;LEFT;ANTERIOR
ORIENTATION: RIGHT;LEFT;ANTERIOR;POSTERIOR

## 2022-10-11 ASSESSMENT — PAIN DESCRIPTION - PAIN TYPE
TYPE: ACUTE PAIN
TYPE: ACUTE PAIN

## 2022-10-11 NOTE — PROGRESS NOTES
Discussion regarding breast milk and pumping. Patient has yet to pump this shift. She states  she isn't feeling good. She is aware that she needs to pump to continue to produce breast milk. She is re-educated on need to pump and dump r/t medications.

## 2022-10-11 NOTE — BH NOTE
Inpatient consult to psychology changed to psychiatry. Lakesha Trevino, will be rounding this afternoon. If for any reason she is unable to see Justine Carranza today, I will plan on seeing tomorrow.

## 2022-10-11 NOTE — CARE COORDINATION
Team Conference held this morning and team discussed DC date for 10/15/22. Plan is for Pt to return home with spouse. SW met with Pt at bedside this AM and discussed DC date - Pt was thrilled with date and stated, \"thank you. I feel a little better knowing I'm getting home soon. \" Pt stated they still do not have a definite discharge date for their 9 month old son from [facility] to home as they are waiting on insurance approval for RN's at home during the night to care for son. Pt states her hope is for her son to be discharged by Halloween or by his 1st Birthday in November. Emotional support provided. SW will continue to follow.     Ankita Squires, [place]  Case Management  [phone]

## 2022-10-11 NOTE — PROGRESS NOTES
Physical Therapy  Facility/Department: [facility]  Rehabilitation Physical Therapy Treatment Note    NAME: Elder Gill  : 1990 (34 y.o.)  MRN: [id number]  CODE STATUS: Full Code    Date of Service: 10/11/22       Restrictions:  Restrictions/Precautions: Fall Risk;Seizure  Position Activity Restriction  Other position/activity restrictions: up with assistance, patient may shower     SUBJECTIVE  Subjective  Subjective: Pt in the bathroom when PT arrived. Pt agreeable to therapy. PT relieved Nsg from pt SVN. Pt req heavy encouragement tp particiapte sine pt did not feel well. Pain: Pt w/ co severe HA and nausea        Post Treatment Pain Screening         OBJECTIVE  Cognition  Overall Cognitive Status: WFL  Arousal/Alertness: Appropriate responses to stimuli  Following Commands: Follows all commands without difficulty  Attention Span: Attends with cues to redirect  Memory: Appears intact  Safety Judgement: Decreased awareness of need for assistance;Decreased awareness of need for safety  Problem Solving: Assistance required to implement solutions;Assistance required to correct errors made  Insights: Decreased awareness of deficits  Sequencing: Does not require cues  Orientation  Overall Orientation Status: Within Normal Limits  Orientation Level: Oriented X4    Functional Mobility  Sit to Supine  Assistance Level: Supervision  Skilled Clinical Factors: VC for a more energy effective technique ( note, HOB elevated significantly)  Scooting  Assistance Level: Independent (For short sitting to get to the EOS)  Balance  Sitting Balance: Independent (AEB pt able to remove gown and todd bra, shirt, sweater, pants and shoes while sitting on EOB. S present only as a precaution)  Standing Balance: Contact guard assistance  Transfers  Surface: To bed; To chair without arms; To chair with arms; Wheelchair;Standard toilet  Additional Factors: Hand placement cues  Sit to Stand  Assistance Level: Supervision  Stand to Sit  Assistance Level: Supervision  Skilled Clinical Factors: VC for hand placement  Stand Pivot  Assistance Level: Supervision (w/c > bed)  Skilled Clinical Factors: VC for hand placement      Environmental Mobility  Ambulation  Surface: Level surface; Uneven surface; Ramp (Walked outside on various graded surfaces, this includes down a ramp x 100')  Device: Rolling walker  Distance: 95'x2+180'  Additional Factors: Verbal cues; Increased time to complete  Assistance Level: Contact guard assist;Stand by assist (As pt fatigued , pt appeared more unsteady)  Gait Deviations: Slow jaqui; Path deviations;Narrow base of support; Unsteady gait  Skilled Clinical Factors: VC for controlled walking on level surfaces with CGA for changes in direction especially with turning corners on the R  Stairs  Stair Height: 4''  Device: One handrail (HR on the L when ascending.)  Number of Stairs: 27  Additional Factors: Reciprocal going up;Reciprocal going down  Assistance Level: Contact guard assist;Stand by assist  Skilled Clinical Factors: Appeared more unsteady as pt fatigued so the last 4-5 steps req CGA  Curb  Curb Height: 4''  Device:  (none)  Number of Curbs: 1  Assistance Level: Stand by assist;Contact guard assist    Second session: Pt positioned supine in bed when PT arrived. Pt's  present . Pt agreeable to therapy. PT instructed pt with the following ex performed in supine to BLES:  1. 1/2 bridges   2. Bridges with hold of \"3\" ( UES across chest)  3. Alternating hip/knee flex/ext x 20 reps  4. Graded SLRs    Leta 10 reps of each   As an aerobic ex, PT instructed pt with chest passes up towards the ceiling x 20 reps  Pt remained in bed with call light and phone placed within reach. Bed alarm reactivated                ASSESSMENT/PROGRESS TOWARDS GOALS       Assessment  Assessment: Pt cont to present with both HA pain and nausea limiting her overall mobility.  Pt pushed herself on this date to participate despite her pain and nausea. Pt appeared steady initially and then became unsteady with fatigue. Pt is below baseline and would benefit from continued therapy to maximze potential and increase functional mobility towards Ind to allow for a safe d/c home. Activity Tolerance: Patient limited by fatigue;Patient limited by pain; Patient limited by endurance  Discharge Recommendations: Home with assist PRN;Home with Home health PT;Continue to assess pending progress  PT Equipment Recommendations  Other: Ongoing assessment    Goals  Patient Goals   Patient Goals : \"To get out of here \"  Short Term Goals  Time Frame for Short Term Goals: 2 weeks  Short Term Goal 1: Ind with bed mobility  Short Term Goal 2: Ind with transf excluding floor transf  Short Term Goal 3: Ind with amb on level surfaces distances > 250' at a time  Short Term Goal 4: MI with amb up and down 12 steps ( used as an endurnace goal)    PLAN OF CARE/SAFETY  Physcial Therapy Plan  Days Per Week: 5 Days  Hours Per Day: 1 hour  Current Treatment Recommendations: Balance training;Strengthening; Functional mobility training;Transfer training; Endurance training;Gait training;Stair training;Neuromuscular re-education; Safety education & training;Patient/Caregiver education & training;Equipment evaluation, education, & procurement; Therapeutic activities  Safety Devices  Type of Devices: All fall risk precautions in place; Chair alarm in place;Call light within reach; Patient at risk for falls; Left in chair;Nurse notified    EDUCATION  Education  Education Given To: Patient  Education Provided: Safety; Mobility Training;Transfer Training;Energy Conservation;Cognition; Fall Prevention Strategies; Home Exercise Program  Education Provided Comments: PT educated on the importance of increasing activity.  Pt also educated on the importance of self monitoring and requesting rests when fatigued  Education Method: Verbal  Barriers to Learning: None  Education Outcome: Verbalized understanding;Continued education needed        Therapy Time   Individual Concurrent Group Co-treatment   Time In 0825         Time Out 1423         Minutes 45         Second Session Therapy Time:   Individual Concurrent Group Co-treatment   Time In 1438         Time Out 1553         Minutes 15           Timed Code Treatment Minutes:  29+46    Total Treatment Minutes:  Sree Mclaughlin, PT, 10/11/22 at 3:33 PM

## 2022-10-11 NOTE — PLAN OF CARE
Problem: Pain  Goal: Verbalizes/displays adequate comfort level or baseline comfort level  10/11/2022 0943 by Marcy Sparks RN  Outcome: Not Progressing  Patient continues to have headache pain, she has been educated to call for pain 5 or above. She declines pain interventions at times.  Encouraged to try non pharmacologic pain interventions including ice, position change dim room

## 2022-10-11 NOTE — PSYCHOTHERAPY
Patient is alert and oriented x 4, she is on fall precautions. Call light and bedside table are within reach. Patient refuses to pump at this time, she states she doesn't feel well and that she will pump at lunch. She is aware that she has to pump and dump.

## 2022-10-11 NOTE — PROGRESS NOTES
Occupational Therapy  Facility/Department: [facility] ACUTE REHAB UNIT  Rehabilitation Occupational Therapy Daily Treatment Note    Date: 10/11/22  Patient Name: Elyse Landaverde       Room: [facility]-  MRN: [id number]  Account: [de-identified]   : 1990  (27 y.o.) Gender: female                    Past Medical History:  has no past medical history on file. Past Surgical History:   has no past surgical history on file. Restrictions  Restrictions/Precautions: Fall Risk;Seizure  Other position/activity restrictions: up with assistance, patient may shower    Subjective  Subjective: Pt semi supine in bed upon arrival, pleasant and agreeable to OT session. Restrictions/Precautions: Fall Risk;Seizure             Objective     Cognition  Arousal/Alertness: Appropriate responses to stimuli  Following Commands: Follows all commands without difficulty  Attention Span: Attends with cues to redirect  Memory: Appears intact  Safety Judgement: Decreased awareness of need for assistance;Decreased awareness of need for safety  Problem Solving: Assistance required to implement solutions;Assistance required to correct errors made  Insights: Decreased awareness of deficits  Sequencing: Does not require cues  Orientation  Overall Orientation Status: Within Normal Limits         ADL  Upper Extremity Dressing  Assistance Level: Set-up  Skilled Clinical Factors: pt doffed gown seated EOB, pt donned new gown with setup  Lower Extremity Dressing  Assistance Level: Stand by assist  Skilled Clinical Factors: pt doffed underwear seated on toilet and donned new underwear, SBA in stance to manage over hips  Putting On/Taking Off Footwear  Assistance Level:  Independent  Skilled Clinical Factors: pt donned/doffed slip on sandals seated on toilet  Toileting  Assistance Level: Supervision  Skilled Clinical Factors: pt continent of bowel and bladder req + time to have BM, perihygiene seated, LB clothing mgmt on hips in stance  Toilet Transfers  Technique:  (ambulatory without AD)  Equipment: Standard toilet  Assistance Level: Stand by assist      Instrumental ADL's  Instrumental ADLs: Yes  Light Housekeeping  Light Housekeeping Level:  (no AD)  Light Housekeeping Level of Assistance: Supervision  Light Housekeeping: In order to simulate house keeping tasks that pt will typically be responsible for at baseline, pt was instructed to unload a bag of groceries from kitchen countertop and organize into overhead cabinets; pt completed task with spvn in stance tolerating ~10 minutes before requiring seated rest break due to head pain. Pt successfully organized items based on type and rearranged as necessary without verbal cues. Pt also i'ly initiated condensing the packets from an opened box of instant oatmeal, opening a new box of instant oatmeal and combining to save room. Functional Mobility  Device:  (no AD)  Activity: To/From bathroom; Retrieve items;Transport items (to/from dining room ~200')  Assistance Level: Contact guard assist  Skilled Clinical Factors: lateral sway but no overt LOB  Supine to Sit  Assistance Level: Supervision  Skilled Clinical Factors: HOB slightly elevated  Sit to Stand  Assistance Level: Stand by assist  Skilled Clinical Factors: from EOB and standard chair  Stand to Sit  Assistance Level: Stand by assist         Assessment  Assessment  Assessment: Pt progressing with ADLs demo SBA-spvn and completed organizational IADL to simulate putting groceries away. Pt demo no difficulty sequencing task and demo improved activity tolerance AEB standing for 10 minutes without difficulty. Pt appears depressed and would benefit from a psychological evaluation to assess needs moving forward due to signifiant trauma pt has experienced. Cont per OT POC. Activity Tolerance: Patient limited by fatigue;Patient limited by pain; Patient limited by endurance  Discharge Recommendations: Outpatient OT;24 hour supervision or assist  OT Equipment Recommendations  Other: continued assessment; may need TTB  Safety Devices  Safety Devices in place: Yes  Type of devices: Left in chair;Nurse notified;Call light within reach; Chair alarm in place    Patient Education  Education  Education Given To: Patient  Education Provided: Role of Therapy;Plan of Care;Transfer Training;ADL Function; Safety;Cognition  Education Method: Demonstration;Verbal  Barriers to Learning: Cognition  Education Outcome: Verbalized understanding;Continued education needed;Demonstrated understanding    Plan  Occupational Therapy Plan  Times Per Week: 5x/wk x60 min  Current Treatment Recommendations: Strengthening;Balance training;Functional mobility training; Endurance training;Neuromuscular re-education;Self-Care / ADL; Home management training; Safety education & training;Patient/Caregiver education & training;Equipment evaluation, education, & procurement    Goals  Patient Goals   Patient goals : improve independence with ADLs  Short Term Goals  Time Frame for Short Term Goals: 2 weeks-all ongoing  Short Term Goal 1: Patient will complete LB dressing with MOD I  Short Term Goal 2: Patient will complete bathing with MOD I  Short Term Goal 3: Patient will complete UB dressing at independent level  Short Term Goal 4: Patient will complete functional transfers, including commode transfer and functional transfers, with MOD I  Short Term Goal 5: Patient will complete simple kitchen task with SPVN-goal met 10/11    AM-PAC Score               Therapy Time   Individual Concurrent Group Co-treatment   Time In 0830         Time Out 0930         Minutes 60         Timed Code Treatment Minutes: [address] [facility],

## 2022-10-11 NOTE — PLAN OF CARE
Problem: Discharge Planning  Goal: Discharge to home or other facility with appropriate resources  Outcome: Progressing   Discharge planning in progress. No barriers to discharge noted. Problem: Skin/Tissue Integrity  Goal: Absence of new skin breakdown  Description: 1. Monitor for areas of redness and/or skin breakdown  2. Assess vascular access sites hourly  3. Every 4-6 hours minimum:  Change oxygen saturation probe site  4. Every 4-6 hours:  If on nasal continuous positive airway pressure, respiratory therapy assess nares and determine need for appliance change or resting period. Outcome: Progressing   Neosporin applied to affected area on scalp. No new skin issues found thus far this shift. Problem: Pain  Goal: Verbalizes/displays adequate comfort level or baseline comfort level  Outcome: Progressing   Pt. Complaint of pain, pain medication administered, see MAR. Pain is being managed with pharmacological and non-pharmacological intervention. Pain level will be decreased or be at a satisfactory level by discharge.

## 2022-10-11 NOTE — PROGRESS NOTES
ACUTE REHAB UNIT  SPEECH/LANGUAGE PATHOLOGY      [x] Daily  [x] Weekly Care Conference Note  [] Discharge    Amadeo Duque      :1990  AMX:[id number]  Rehab Dx/Hx: SAH (subarachnoid hemorrhage) ([facility] [address] [I60.9]    Precautions: [] Aspiration  [x] Fall risk  [] Sternal  [x] Seizure [] Hip  [] Weight Bearing [] Other  ST Dx: [] Aphasia  [] Dysarthria  [] Apraxia   [] Oropharyngeal dysphagia [x] Cognitive Impairment  [] Other:   Date of Admit: 10/7/2022  Room #: 1865/4104-22  Date: 10/11/2022          Current Diet Order:ADULT DIET; Regular      Lives With: Spouse (10 mo son, who is currently still hospitalized at [facility].)  Homemaking Responsibilities: Yes  Education: bachelor's degree  Bill Paying/Finance Responsibility: Primary (online)  Health Care Management: Primary (out of the bottle)  Type of Occupation: previously worked full time in [employer], 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 Leisure & Hobbies: reading, walking, spending time with [employer], playing with animals (3 cats, dog)     Vision  Vision: Within Functional Limits  Hearing  Hearing: Within functional limits  Barriers toward progress: Pain, appropriately tearful     Date: 10/11/2022       Tx session 1 Tx session 2 Weekly Summary   Total Timed Code Min 30 30    Total Treatment Minutes 30 30    Individual Treatment Minutes 30 30    Group Treatment Minutes 0 0    Co-Treat Minutes 0 0    Brief Exception: N/A N/A    Pain Headache-RN aware Headache-RN aware    Pain Intervention: [] RN notified  [] Repositioned  [] Intervention offered and patient declined  [x] N/A  [] Other: [] RN notified  [] Repositioned  [] Intervention offered and patient declined  [x] N/A  [] Other:    Subjective:     Pt seated upright in chair with lights off in room. Agreeable to session at this time. Pt with increased flat affect, reduced participation and cues for encouragement required for task completion.  Pt seated upright in chair agreeable to 2nd session at this time. Improved participation and engagement. Objective / Goals:      Pt will complete graded tasks with adequate executive function skills with 90% accuracy independently. Targeted via additional DANIELA administration below. Completed prescription labels+problem solvin% accuracy, increased to 100% with min cue. Deduction puzzle: 90%, benefited from min cues 100% accuracy. Money management: 100% accuracy, expressed adequate rationale for verbal problem solving of additional questions. CONTINUE   Pt will initiate responses <5 seconds across 3 sessions. X2 instances of cues for initiation required. Pt expressed \"I just don't know the answer\" with task termination. Adequate initiation of responses across all attempts. CONTINUE   Pt will demonstrate planning and organization during graded tasks with 90% accuracy independently. Not directly targeted, however adequate thought organization in prescription label task. Provided with leisure task and instructions to explain game given written information. Pt with decreased thought organization and lacking detailed explanation. Mod cues from SLP to re-read and re-sequence steps for increased comprehension. CONTINUE   Pt will tolerate ongoing cognitive-linguistic assessment as clinically indicated. Patient was administered portions of the DANIELA (Assessment of Language-Related Functional Activities) with the following results:  Solving Daily Math Problems: 90% (completed in 2.5 minutes)  Using a Calendar: 100%   Reading Instructions: 90% accuracy  Only errors appeared due to mild impulsivity and ? Reduced motivation/care for task completion accuracy? Not directly targeted this session. CONTINUE   Other areas targeted:      Education:   Educated re: rationale for tasks/additional assessment targeting higher level cognitive skills. Educated re: goals being targeted.     Safety Devices: [x] Call light within reach  [] Chair alarm activated and connected to nurse call light system  [x] Bed alarm activated   [] Other: [x] Call light within reach  [] Chair alarm activated and connected to nurse call light system  [x] Bed alarm activated  [] Other:     Assessment: Pt presents with mild executive dysfunction noted by reduced planning/thought organization of higher level tasks. Accuracy appears to be negatively impacted by intermittent impulsivity, reduced motivation (as verbally expressed by pt and observed in sessions) and emotional/baseline stressors. Plan: Continue as per plan of care.        Interventions used this date:  [] Speech/Language Treatment  [] Instruction in HEP  [] Dysphagia Treatment [x] Cognitive Treatment   [] Other:    Discharge recommendations:  [] Home independently  [x] Home with assistance []  24 hour supervision  [] ECF [] Other  Continued Tx Upon Discharge: ? [] Yes    [] No    [x] TBD based on progress while on ARU     [] Vital Stim indicated     [] Other:   Estimated discharge date: October 15th, 2022      Electronically signed by:  Dwaine Patel M.A., [address] [place] [address]  Speech-Language Pathologist

## 2022-10-11 NOTE — PROGRESS NOTES
Shift assessment complete, VSS, afebrile, medications taken without difficulty. Pt. Assisted to bathroom and back to bed CGA X1. Pt. Voiced head pain, treated with PRN Fioricet per order, see MAR. Pt. Given the opportunity to pump 4 oz milk which was discarded by RN. Call light and over bed table within reach, hourly rounding and frequent visual checks in place.

## 2022-10-11 NOTE — PROGRESS NOTES
Department of Physical Medicine & Rehabilitation  Progress Note    Patient Identification:  Junior Bowen  [id number]  : 1990  Admit date: 10/7/2022    Chief Complaint: SAH (subarachnoid hemorrhage) ([address]    Subjective:   Feels better today but continues to have headaches. She is upset about her not being able to give breast milk to her baby due to medications. Discussed seizure meds with neurology. Will DC patient home Saturday. Seen in her room today. Team conference today. ROS: No f/c, n/v, cp     Objective:  Patient Vitals for the past 24 hrs:   BP Temp Temp src Pulse Resp SpO2   10/11/22 0745 (!) 149/84 98.2 °F (36.8 °C) Oral 70 18 98 %   10/10/22 2038 131/86 98 °F (36.7 °C) Oral 81 -- 92 %       Const: Alert. No distress, pleasant. HEENT: Normocephalic, atraumatic. Normal sclera/conjunctiva. MMM. CV: Regular rate and rhythm. Resp: No respiratory distress. Lungs CTAB. Abd: Soft, nontender, nondistended, NABS+   Ext: No edema. Neuro: Alert, oriented, appropriately interactive. Psych: Cooperative, appropriate mood and affect    Laboratory data: Available via EMR.    Last 24 hour lab  Recent Results (from the past 24 hour(s))   POCT Glucose    Collection Time: 10/10/22  4:54 PM   Result Value Ref Range    POC Glucose 113 (H) 70 - 99 mg/dl    Performed on ACCU-CHEK    POCT Glucose    Collection Time: 10/10/22  8:35 PM   Result Value Ref Range    POC Glucose 119 (H) 70 - 99 mg/dl    Performed on ACCU-CHEK    POCT Glucose    Collection Time: 10/11/22  7:17 AM   Result Value Ref Range    POC Glucose 111 (H) 70 - 99 mg/dl    Performed on ACCU-CHEK    POCT Glucose    Collection Time: 10/11/22 11:44 AM   Result Value Ref Range    POC Glucose 112 (H) 70 - 99 mg/dl    Performed on ACCU-CHEK        Therapy progress:  PT  Position Activity Restriction  Other position/activity restrictions: up with assistance, patient may shower  Objective     Sit to Stand: Contact guard assistance (VC for hand placement . Pt unsteady with transition req CGA and use of the RW to be stable)  Stand to Sit: Contact guard assistance (Decreased eccentric control with increased urgency when pt reported that she was \"going to be sick\")  Bed to Chair: Contact guard assistance (Used the RW and performed stepping with walker to get to the chair)  Device: [address]: Contact guard assistance, Minimal assistance  Distance: 20'  OT  PT Equipment Recommendations  Other: Ongoing assessment  Toilet Transfers Comments: unable to assess d/t time constraints  Assessment        SLP          Body mass index is 48.35 kg/m². Assessment and Plan:  #Multifocal hemorrhagic contusions   Scattered SAH   Superior enplate deformities of T11 & T12,indeterminat   -No neurosurgical intervention indicated at this time   -Heparin gtt for DVST -HCT when therapeutic   -CTA outpatient  -PT/OT/SLP  -Pain management: PRN Tylenol, low dose Oxycodone      #L mastoid temporal bone fracture with middle ear and mastoid effusion  #Diastases of the left occipital mastoid suture. Fracture involving the left sigmoid plate with pneumocephalus.   - CT max/face 10/3 with L occipital mastoid suture diastases and L sigmoid plate fracture with pneumocephalus   - Outpatient audiogram in 1-2 weeks with ENT follow up                                   # Hx of Diabetes (Metformin at home)    -No acute issues   - SSI  - metformin low dose                    # Obesity   -counseling                       #DVST    partially occlusive thrombus within left sigmoid and transverse sinuses   -Lovenox       Team conference was held today on the patient and discussed directly with the patient utilizing their entire treatment team. Please see separate team note for details. Total treatment time for today's care >35 min. >50% of time spent counseling with patient and coordinating care.        Rehab Progress: Improving  Anticipated Dispo: home  Services/DME: HH  ELOS: 10/15      Evelio Mccormack D.O. M.P.H  PM&R  10/11/2022  12:36 PM

## 2022-10-12 PROBLEM — F32.A DEPRESSION: Status: ACTIVE | Noted: 2022-10-12

## 2022-10-12 LAB
ANION GAP SERPL CALCULATED.3IONS-SCNC: 14 MMOL/L (ref 3–16)
BASOPHILS ABSOLUTE: 0 K/UL (ref 0–0.2)
BASOPHILS RELATIVE PERCENT: 0.4 %
BUN BLDV-MCNC: 17 MG/DL (ref 7–20)
CALCIUM SERPL-MCNC: 9.2 MG/DL (ref 8.3–10.6)
CHLORIDE BLD-SCNC: 100 MMOL/L (ref 99–110)
CO2: 23 MMOL/L (ref 21–32)
CREAT SERPL-MCNC: <0.5 MG/DL (ref 0.6–1.1)
EOSINOPHILS ABSOLUTE: 0.1 K/UL (ref 0–0.6)
EOSINOPHILS RELATIVE PERCENT: 1.3 %
GFR AFRICAN AMERICAN: >60
GFR NON-AFRICAN AMERICAN: >60
GLUCOSE BLD-MCNC: 104 MG/DL (ref 70–99)
GLUCOSE BLD-MCNC: 79 MG/DL (ref 70–99)
GLUCOSE BLD-MCNC: 93 MG/DL (ref 70–99)
GLUCOSE BLD-MCNC: 95 MG/DL (ref 70–99)
GLUCOSE BLD-MCNC: 98 MG/DL (ref 70–99)
HCT VFR BLD CALC: 37.3 % (ref 36–48)
HEMOGLOBIN: 12.3 G/DL (ref 12–16)
LYMPHOCYTES ABSOLUTE: 3.1 K/UL (ref 1–5.1)
LYMPHOCYTES RELATIVE PERCENT: 33.3 %
MCH RBC QN AUTO: 27.3 PG (ref 26–34)
MCHC RBC AUTO-ENTMCNC: 32.9 G/DL (ref 31–36)
MCV RBC AUTO: 82.9 FL (ref 80–100)
MONOCYTES ABSOLUTE: 0.6 K/UL (ref 0–1.3)
MONOCYTES RELATIVE PERCENT: 5.9 %
NEUTROPHILS ABSOLUTE: 5.5 K/UL (ref 1.7–7.7)
NEUTROPHILS RELATIVE PERCENT: 59.1 %
PDW BLD-RTO: 14.4 % (ref 12.4–15.4)
PERFORMED ON: ABNORMAL
PERFORMED ON: NORMAL
PLATELET # BLD: 238 K/UL (ref 135–450)
PMV BLD AUTO: 8.1 FL (ref 5–10.5)
POTASSIUM REFLEX MAGNESIUM: 3.9 MMOL/L (ref 3.5–5.1)
RBC # BLD: 4.49 M/UL (ref 4–5.2)
SODIUM BLD-SCNC: 137 MMOL/L (ref 136–145)
WBC # BLD: 9.4 K/UL (ref 4–11)

## 2022-10-12 PROCEDURE — 97129 THER IVNTJ 1ST 15 MIN: CPT

## 2022-10-12 PROCEDURE — 99232 SBSQ HOSP IP/OBS MODERATE 35: CPT | Performed by: PHYSICAL MEDICINE & REHABILITATION

## 2022-10-12 PROCEDURE — 97110 THERAPEUTIC EXERCISES: CPT

## 2022-10-12 PROCEDURE — 97535 SELF CARE MNGMENT TRAINING: CPT

## 2022-10-12 PROCEDURE — 6370000000 HC RX 637 (ALT 250 FOR IP): Performed by: NURSE PRACTITIONER

## 2022-10-12 PROCEDURE — 97130 THER IVNTJ EA ADDL 15 MIN: CPT

## 2022-10-12 PROCEDURE — 6360000002 HC RX W HCPCS: Performed by: NEUROLOGICAL SURGERY

## 2022-10-12 PROCEDURE — 85025 COMPLETE CBC W/AUTO DIFF WBC: CPT

## 2022-10-12 PROCEDURE — 80048 BASIC METABOLIC PNL TOTAL CA: CPT

## 2022-10-12 PROCEDURE — 6360000002 HC RX W HCPCS: Performed by: PHYSICAL MEDICINE & REHABILITATION

## 2022-10-12 PROCEDURE — 1280000000 HC REHAB R&B

## 2022-10-12 PROCEDURE — 36415 COLL VENOUS BLD VENIPUNCTURE: CPT

## 2022-10-12 PROCEDURE — 97530 THERAPEUTIC ACTIVITIES: CPT

## 2022-10-12 PROCEDURE — 6370000000 HC RX 637 (ALT 250 FOR IP): Performed by: PHYSICAL MEDICINE & REHABILITATION

## 2022-10-12 RX ORDER — ZOLPIDEM TARTRATE 5 MG/1
5 TABLET ORAL NIGHTLY
Status: DISCONTINUED | OUTPATIENT
Start: 2022-10-12 | End: 2022-10-14 | Stop reason: HOSPADM

## 2022-10-12 RX ADMIN — ACETAMINOPHEN 650 MG: 325 TABLET ORAL at 08:34

## 2022-10-12 RX ADMIN — Medication 400 MG: at 08:20

## 2022-10-12 RX ADMIN — BACITRACIN ZINC AND POLYMYXIN B SULFATE: 500; 10000 OINTMENT TOPICAL at 21:11

## 2022-10-12 RX ADMIN — BUTALBITAL, ACETAMINOPHEN, AND CAFFEINE 1 TABLET: 50; 325; 40 TABLET ORAL at 08:20

## 2022-10-12 RX ADMIN — Medication 5 MG: at 21:13

## 2022-10-12 RX ADMIN — OXYCODONE HYDROCHLORIDE 7.5 MG: 15 TABLET ORAL at 14:05

## 2022-10-12 RX ADMIN — DEXAMETHASONE 2 MG: 4 TABLET ORAL at 05:57

## 2022-10-12 RX ADMIN — SERTRALINE HYDROCHLORIDE 25 MG: 25 TABLET ORAL at 08:20

## 2022-10-12 RX ADMIN — ONDANSETRON 4 MG: 4 TABLET, ORALLY DISINTEGRATING ORAL at 14:02

## 2022-10-12 RX ADMIN — METFORMIN HYDROCHLORIDE 500 MG: 500 TABLET ORAL at 08:20

## 2022-10-12 RX ADMIN — BUTALBITAL, ACETAMINOPHEN, AND CAFFEINE 1 TABLET: 50; 325; 40 TABLET ORAL at 14:02

## 2022-10-12 RX ADMIN — METFORMIN HYDROCHLORIDE 500 MG: 500 TABLET ORAL at 18:12

## 2022-10-12 RX ADMIN — ENOXAPARIN SODIUM 120 MG: 150 INJECTION SUBCUTANEOUS at 11:06

## 2022-10-12 RX ADMIN — BACITRACIN ZINC AND POLYMYXIN B SULFATE: 500; 10000 OINTMENT TOPICAL at 14:03

## 2022-10-12 RX ADMIN — ZOLPIDEM TARTRATE 5 MG: 5 TABLET ORAL at 21:13

## 2022-10-12 RX ADMIN — PANTOPRAZOLE SODIUM 40 MG: 40 TABLET, DELAYED RELEASE ORAL at 05:57

## 2022-10-12 RX ADMIN — ENOXAPARIN SODIUM 120 MG: 150 INJECTION SUBCUTANEOUS at 21:13

## 2022-10-12 RX ADMIN — ACETAMINOPHEN 650 MG: 325 TABLET ORAL at 21:13

## 2022-10-12 ASSESSMENT — PAIN - FUNCTIONAL ASSESSMENT
PAIN_FUNCTIONAL_ASSESSMENT: ACTIVITIES ARE NOT PREVENTED
PAIN_FUNCTIONAL_ASSESSMENT: PREVENTS OR INTERFERES SOME ACTIVE ACTIVITIES AND ADLS
PAIN_FUNCTIONAL_ASSESSMENT: ACTIVITIES ARE NOT PREVENTED
PAIN_FUNCTIONAL_ASSESSMENT: PREVENTS OR INTERFERES SOME ACTIVE ACTIVITIES AND ADLS

## 2022-10-12 ASSESSMENT — PAIN DESCRIPTION - ONSET
ONSET: PROGRESSIVE
ONSET: ON-GOING

## 2022-10-12 ASSESSMENT — PAIN DESCRIPTION - LOCATION
LOCATION: GENERALIZED;HEAD
LOCATION: HEAD

## 2022-10-12 ASSESSMENT — PAIN DESCRIPTION - FREQUENCY
FREQUENCY: CONTINUOUS

## 2022-10-12 ASSESSMENT — PAIN DESCRIPTION - DESCRIPTORS
DESCRIPTORS: ACHING

## 2022-10-12 ASSESSMENT — PAIN SCALES - GENERAL
PAINLEVEL_OUTOF10: 7
PAINLEVEL_OUTOF10: 9
PAINLEVEL_OUTOF10: 9
PAINLEVEL_OUTOF10: 8

## 2022-10-12 ASSESSMENT — PAIN DESCRIPTION - PAIN TYPE
TYPE: ACUTE PAIN

## 2022-10-12 ASSESSMENT — PAIN DESCRIPTION - ORIENTATION
ORIENTATION: RIGHT;POSTERIOR
ORIENTATION: ANTERIOR

## 2022-10-12 NOTE — CONSULTS
Psychiatry Initial Consultation    Patient Name: Lizeth Funk  MRN: [id number]  Admission Date: 10/7/2022    Reason for Consult: Depression    HPI:   Lizeth Funk is a 34 y.o. female who presented to the ARU on 10/07/2022. Per H&P , patient presents to the ED via EMS after sustaining a 10 foot fall from her attic to her garage concrete floor. Patient had hit her head and there was blood present on the concrete floor. She was initially a GCS of 15 per EMS but deteriorated to GCS 10 en route and started vomiting. She was given hypertonic saline. In the ED, patient was confused but able to answer questions. Presented as a GCS 15. CT of the head revealed multifocal hemorrhagic contusions within the inferomedial frontal lobes, greater on the left. 2.  Scattered subarachnoid hemorrhage. 3.  Nondisplaced left mastoid temporal bone fracture with middle ear and mastoid effusion. Pt did not require neurosurgical intervention during hospital stay. Met with Kd Ruby, who was alert, pleasant during interactions. She states she fell out a garage attic and was hospitalized at [facility] prior to transfer to [facility]. She does not remember much of the admission at [facility]. States that her son is 5 months old and has a genetic disorder. He is currently at [facility], has a trach and requires around the clock care. She spends 5 days/nights up at the hospital and her  is there the other 2. Her son was supposed to discharge home on 10/1 but insurance wouldn't cover home care and they appealed his discharge. She endorses some depression, feeling overwhelmed and like she is not getting things done that she should be doing. She denies SI. She denies HI and denies issues bonding with her son. She did not have depression prior to her son. She believes it to be situational and feels it will improve once she is home and around her family.   We discussed her current Zoloft and she notes it was not a home medication (chart review shows it was prescribed prior to UC but she notes she was not taking it); it appears to have been started at [facility]. She is okay with continuing it. We also discussed outpatient therapy and she expressed interest but is concerned due to her schedule and being a caregiver to her son; recommended considering telehealth appointments. Duration: Ongoing   Severity: Moderate  Context: Stress, medical issues   Associated Symptoms: As above    Past Psychiatric History:    Previous Diagnoses: Depression, ALMAS, ADHD  Previous Hospitalizations: Denies  Outpatient Treatment: Denies   Past Medication Trials: Buspar, Zoloft  Suicidality: Denies previous suicide attempts; denies recent/current suicidal ideations    Past Medical History:  History reviewed. No pertinent past medical history. Home Medications:  Prior to Admission medications    Medication Sig Start Date End Date Taking? Authorizing Provider   losartan (COZAAR) 25 MG tablet Take 25 mg by mouth daily 7/14/22 7/14/23 Yes Historical Provider, MD   loratadine (CLARITIN) 10 MG tablet Take 10 mg by mouth daily 3/10/22  Yes Historical Provider, MD   sertraline (ZOLOFT) 25 MG tablet  7/14/22   Historical Provider, MD     Chemical Dependency History:   Tobacco: Per chart review, never smoker  Alcohol: Per chart review, never  Illicit: None found on chart review    Family Hx:    No family history on file.      Social Hx:   Marital Status:   Children: 1 son  Housing: Living at home   Educational: Per chart review, master's degree  Vocational: Not currently working   Abuse/Trauma: Son currently hospitalized at [facility], requires around the clock care  Legal: None disclosed     Current Medications Ordered:   melatonin  5 mg Oral Nightly    bacitracin-polymyxin b   Topical BID    [START ON 10/13/2022] dexamethasone  2 mg Oral Daily    insulin lispro  0-4 Units SubCUTAneous Nightly    insulin lispro  0-8 Units SubCUTAneous TID WC    enoxaparin  1 mg/kg (Order-Specific) SubCUTAneous BID magnesium oxide  400 mg Oral Daily    sertraline  25 mg Oral Daily    metFORMIN  500 mg Oral BID WC    bisacodyl  5 mg Oral Daily    pantoprazole  40 mg Oral QAM AC      PRN Meds: traZODone, butalbital-acetaminophen-caffeine, oxyCODONE, acetaminophen, ondansetron, magnesium hydroxide, polyethylene glycol, glucose, dextrose bolus **OR** dextrose bolus, glucagon (rDNA), dextrose     ROS: See Medical H&PE     PE:    /86   Pulse 76   Temp 98.3 °F (36.8 °C) (Oral)   Resp 18   Ht 5' 3\" (1.6 m) Comment: 5 foot 3  Wt 272 lb 14.9 oz (123.8 kg)   SpO2 96%   BMI 48.35 kg/m²       Motor / Gait: Observed laying in bed, gait deferred  AIMS: 0    Mental Status Examination:    Appearance: WF, appears stated age, lying in bed, wearing personal attire, fair grooming and hygiene   Behavior/Attitude Toward Examiner: Cooperative, attentive, good eye contact  Speech: Spontaneous, normal rate, normal volume and well articulated   Mood: \"Not that great\"  Affect: Mood congruent   Thought Processes: Logical  Thought Content: Denies SI, denies HI, no delusions voiced, no obsessions  Perceptions: No AVH verbalized, did not appear to be RTIS  Attention: Intact to interview  Cognition: Average fund of knowledge, alert and oriented to person, place, time, and situation, immediate, recent, and remote recall grossly intact (states she does not remember admission at [facility])  Insight: WNL  Judgment: WNL     LAB: Reviewed all labs obtained during admission to date. Dx:   Primary Psychiatric (DSM V) Diagnosis: Depression, unspecified  Secondary Psychiatric (DSM V) Diagnoses: None  Chemical Dependency Diagnoses: None     Assessment  Reviewed nursing and ancillary staff notes since arrival to hospital, reviewed previous records and records available through [address] [place] [address]. Evaluated medications and assessed for side effects and effectiveness.  Assessed patient's educational needs including reviewing plan of care, medications, and diagnosis. Recommendations:    Continue Zoloft 25 mg daily. She states she was started on this at [facility], although chart review indicates it was prescribed prior to admission. If she was not taking it and it was restarted, she has been on it for approximately 1.5 weeks at this time. It can take 4-6 weeks to become effective. We discussed this and she is aware that there is room for adjustment in the future should she return home and still experience symptoms of depression. Does not require inpatient psychiatric admission. Recommend follow up with outpatient therapy services. Will place list of outpatient referrals in her discharge instructions. She is aware they will be in her AVS.     Spent >80 minutes face to face with patient of which >50% was spent counseling and providing education regarding diagnosis, treatment options, and prognosis. Thank you for consult. Please do not hesitate to contact provider if there are additional questions regarding patient.     Angela Ramirez, MPH, PMHNP-BC  10/12/22

## 2022-10-12 NOTE — DISCHARGE INSTRUCTIONS
Appointment with Dr. Shaina Machuca MD - Neurosurgery  10/24/2022 @ 1:40 [address] [employer]  --> Can search for providers based on location, insurance, etc.      [facility] [facility] Therapy and Psychiatry (Medication Management)  [facility]  Location: [address]  Phone: [phone]    [facility]  Location: [facility] in [facility] [address], [address]. Phone: [phone]    [facility]  Location: Multiple offices in the [facility]  Phone: [phone] or [address] [place], [place] [address]way  Locations: Multiple locations in [facility]  Phone: [phone]    [facility] [employer]  Location: [address] [facility]  Phone: [address]  Location: Multiple offices in [facility]   Phone: [phone] [facility] and [address] [facility]  Location: offices in [place]  Phone: [address]  Location: [place], [place]  Phone: [phone]    Dr. Staci Arreguin   Location: [address]    Phone: [facility]  Location: [address]    Phone: [phone]    Mental Health Therapy Only, No Psychiatry (Medication Management)    [facility]  Location: [facility] [address]  Phone: [phone]    [facility]  Location: [address]  Phone: [phone]

## 2022-10-12 NOTE — PROGRESS NOTES
Shift assessment complete, VSS, afebrile, medications taken without difficulty. Patient headache, PRN Fioricet administered per protocol, see MAR. Pt. Reminded to set clock for pumping milk. Pt. Given opportunity to pump milk, declined thus far.

## 2022-10-12 NOTE — PROGRESS NOTES
ACUTE REHAB UNIT  SPEECH/LANGUAGE PATHOLOGY      [x] Daily  [] Weekly Care Conference Note  [] Discharge    Jana Dasilva      :1990  REMEDIOS:[id number]  Rehab Dx/Hx: SAH (subarachnoid hemorrhage) ([address] [I60.9]    Precautions: [] Aspiration  [x] Fall risk  [] Sternal  [x] Seizure [] Hip  [] Weight Bearing [] Other  ST Dx: [] Aphasia  [] Dysarthria  [] Apraxia   [] Oropharyngeal dysphagia [x] Cognitive Impairment  [] Other:   Date of Admit: 10/7/2022  Room #: 6996/0216-98  Date: 10/12/2022          Current Diet Order:ADULT DIET; Regular      Lives With: Spouse (10 mo son, who is currently still hospitalized [facility]  Homemaking Responsibilities: Yes  Education: bachelor's degree  Bill Paying/Finance Responsibility: Primary (online)  Health Care Management: Primary (out of the bottle)  Type of Occupation: previously worked full time in [employer], 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 Leisure & Hobbies: reading, walking, spending time with [employer], playing with animals (3 cats, dog)     Vision  Vision: Within Functional Limits  Hearing  Hearing: Within functional limits  Barriers toward progress: Pain, appropriately tearful     Date: 10/12/2022      Tx session 1 Tx session 2   Total Timed Code Min 30 30   Total Treatment Minutes 30 30   Individual Treatment Minutes 30 30   Group Treatment Minutes 0 0   Co-Treat Minutes 0 0   Brief Exception: N/A N/A   Pain Endorses ongoing pain; RN notified pt of next pain medication. 9/10 persistent headache, ice pack in place   Pain Intervention: [] RN notified  [] Repositioned  [] Intervention offered and patient declined  [x] N/A  [] Other: [] RN notified  [] Repositioned  [] Intervention offered and patient declined  [x] N/A  [] Other:   Subjective:     Pt upright in chair and agreeable to session. Pt partially reclined in bed but agreeable to participate despite feeling poorly.     Objective / Goals:     Pt will complete graded tasks with adequate executive function skills with 90% accuracy independently. Logic links - min cues; errors related to cognitive flexibility only  ID activities from abstract descriptions  - Incidental cues only for unfamiliar event   Pt will initiate responses <5 seconds across 3 sessions. Consistent responses across session. Consistent responses across session. Pt will demonstrate planning and organization during graded tasks with 90% accuracy independently. Goal not directly targeted. Verbal pathfinding using map - min cues consistently for physical perspective taking. Barrier activity - verbal: independent   Pt will tolerate ongoing cognitive-linguistic assessment as clinically indicated. Goal not targeted this session. Goal not targeted this session. Other areas targeted:     Education:   Education ongoing regarding tasks this date. Pt was educated on the stages of grieving with use of video, recommendation for ongoing speech therapy at discharge, importance of completing her own rehabilitation in order to facilitate being able to achieve her primary goal of caring for her son with multiple medical needs. Pt endorses understanding. Education on identifying overstimulation triggers - pt with reduced ability to ask for cognitive break during session despite increased RR, motoric agitations. Encouraged attempting to increase tolerance using preferred auditory stimuli (music) when not in therapy. Encouragement to seek counseling services for her traumatic year of events at discharge in order to assist in her cognitive rehabilitation and ongoing recovery. Education on deficits noted this date and additional activities to target tomorrow.     Safety Devices: [x] Call light within reach  [x] Chair alarm activated and connected to nurse call light system  [] Bed alarm activated   [] Other: [x] Call light within reach  [x] Chair alarm activated and connected to nurse call light system  [] Bed alarm activated  [] Other: Assessment: Pt with mild executive dysfunction ongoing but improving. Plan: Continue as per plan of care.       Interventions used this date:  [] Speech/Language Treatment  [] Instruction in HEP  [] Dysphagia Treatment [x] Cognitive Treatment   [] Other:    Discharge recommendations:  [] Home independently  [x] Home with assistance []  24 hour supervision  [] ECF [] Other  Continued Tx Upon Discharge: ? [x] Yes    [] No    [] TBD based on progress while on ARU     [] Vital Stim indicated     [] Other:   Estimated discharge date: October 15th, 2022    Electronically signed by:  Martha Pang M.A., [place]  Speech-Language Pathologist

## 2022-10-12 NOTE — PROGRESS NOTES
Department of Physical Medicine & Rehabilitation  Progress Note    Patient Identification:  Tianna Rosado  [id number]  : 1990  Admit date: 10/7/2022    Chief Complaint: SAH (subarachnoid hemorrhage) ([address]    Subjective:   Continues to have headaches. She is going to go home Saturday no matter her condition to be able to see her baby. Difficulty with sleep last night and is asking for some sleep medication. Seen in bed today. ROS: No f/c, n/v, cp     Objective:  Patient Vitals for the past 24 hrs:   BP Temp Temp src Pulse Resp SpO2   10/12/22 0836 130/86 98.4 °F (36.9 °C) Oral 76 17 --   10/11/22 2030 134/85 98.3 °F (36.8 °C) Oral 90 18 96 %       Const: Alert. No distress, pleasant. HEENT: Normocephalic, atraumatic. Normal sclera/conjunctiva. MMM. CV: Regular rate and rhythm. Resp: No respiratory distress. Lungs CTAB. Abd: Soft, nontender, nondistended, NABS+   Ext: No edema. Neuro: Alert, oriented, appropriately interactive. Psych: Cooperative, appropriate mood and affect    Laboratory data: Available via EMR.    Last 24 hour lab  Recent Results (from the past 24 hour(s))   POCT Glucose    Collection Time: 10/11/22 11:44 AM   Result Value Ref Range    POC Glucose 112 (H) 70 - 99 mg/dl    Performed on ACCU-CHEK    POCT Glucose    Collection Time: 10/11/22  5:55 PM   Result Value Ref Range    POC Glucose 97 70 - 99 mg/dl    Performed on ACCU-CHEK    POCT Glucose    Collection Time: 10/11/22  9:49 PM   Result Value Ref Range    POC Glucose 99 70 - 99 mg/dl    Performed on ACCU-CHEK    Basic Metabolic Panel w/ Reflex to MG    Collection Time: 10/12/22  5:45 AM   Result Value Ref Range    Sodium 137 136 - 145 mmol/L    Potassium reflex Magnesium 3.9 3.5 - 5.1 mmol/L    Chloride 100 99 - 110 mmol/L    CO2 23 21 - 32 mmol/L    Anion Gap 14 3 - 16    Glucose 98 70 - 99 mg/dL    BUN 17 7 - 20 mg/dL    Creatinine <0.5 (L) 0.6 - 1.1 mg/dL    GFR Non-African American >60 >60    GFR  >60 >60    Calcium 9.2 8.3 - 10.6 mg/dL   CBC auto differential    Collection Time: 10/12/22  5:45 AM   Result Value Ref Range    WBC 9.4 4.0 - 11.0 K/uL    RBC 4.49 4.00 - 5.20 M/uL    Hemoglobin 12.3 12.0 - 16.0 g/dL    Hematocrit 37.3 36.0 - 48.0 %    MCV 82.9 80.0 - 100.0 fL    MCH 27.3 26.0 - 34.0 pg    MCHC 32.9 31.0 - 36.0 g/dL    RDW 14.4 12.4 - 15.4 %    Platelets 273 659 - 315 K/uL    MPV 8.1 5.0 - 10.5 fL    Neutrophils % 59.1 %    Lymphocytes % 33.3 %    Monocytes % 5.9 %    Eosinophils % 1.3 %    Basophils % 0.4 %    Neutrophils Absolute 5.5 1.7 - 7.7 K/uL    Lymphocytes Absolute 3.1 1.0 - 5.1 K/uL    Monocytes Absolute 0.6 0.0 - 1.3 K/uL    Eosinophils Absolute 0.1 0.0 - 0.6 K/uL    Basophils Absolute 0.0 0.0 - 0.2 K/uL   POCT Glucose    Collection Time: 10/12/22  7:59 AM   Result Value Ref Range    POC Glucose 95 70 - 99 mg/dl    Performed on ACCU-CHEK        Therapy progress:  PT  Position Activity Restriction  Other position/activity restrictions: up with assistance, patient may shower  Objective     Sit to Stand: Contact guard assistance (VC for hand placement . Pt unsteady with transition req CGA and use of the RW to be stable)  Stand to Sit: Contact guard assistance (Decreased eccentric control with increased urgency when pt reported that she was \"going to be sick\")  Bed to Chair: Contact guard assistance (Used the RW and performed stepping with walker to get to the chair)  Device: [address]: Contact guard assistance, Minimal assistance  Distance: 20'  OT  PT Equipment Recommendations  Other: Ongoing assessment  Toilet Transfers Comments: unable to assess d/t time constraints  Assessment        SLP          Body mass index is 48.35 kg/m².     Assessment and Plan:  #Multifocal hemorrhagic contusions   Scattered SAH   Superior enplate deformities of T11 & T12,indeterminat   -No neurosurgical intervention indicated at this time   -Heparin gtt for DVST -HCT when therapeutic   -CTA outpatient  -PT/OT/SLP  -Pain management: PRN Tylenol, low dose Oxycodone      #L mastoid temporal bone fracture with middle ear and mastoid effusion  #Diastases of the left occipital mastoid suture.  Fracture involving the left sigmoid plate with pneumocephalus.   - CT max/face 10/3 with L occipital mastoid suture diastases and L sigmoid plate fracture with pneumocephalus   - Outpatient audiogram in 1-2 weeks with ENT follow up                                   # Hx of Diabetes (Metformin at home)    -No acute issues   - SSI  - metformin low dose                    # Obesity   -counseling                       #DVST    partially occlusive thrombus within left sigmoid and transverse sinuses   -Lovenox     # sleep disturbance  - Ambien 5mg qhs       Rehab Progress: Improving  Anticipated Dispo: home  Services/DME: [facility]  ELOS: 10/15      Clemencia Mcpherson D.O. M.P.H  PM&R  10/12/2022  10:53 AM

## 2022-10-12 NOTE — PROGRESS NOTES
Shift assessment complete. VSS. A/Ox4. Fall precautions in place. Headaches improving after fiorocet and tylenol. Agreeable to express breast milk. No complaints at this time. Call light in reach. Will continue to monitor.      Vitals:    10/12/22 0836   BP: 130/86   Pulse: 76   Resp: 17   Temp: 98.4 °F (36.9 °C)   SpO2:

## 2022-10-12 NOTE — PLAN OF CARE
Problem: Discharge Planning  Goal: Discharge to home or other facility with appropriate resources  Recent Flowsheet Documentation  Taken 10/12/2022 0836 by Khadar Alfaro RN  Discharge to home or other facility with appropriate resources:   Identify barriers to discharge with patient and caregiver   Arrange for needed discharge resources and transportation as appropriate   Identify discharge learning needs (meds, wound care, etc)     Problem: Skin/Tissue Integrity  Goal: Absence of new skin breakdown  Description: 1. Monitor for areas of redness and/or skin breakdown  2. Assess vascular access sites hourly  3. Every 4-6 hours minimum:  Change oxygen saturation probe site  4. Every 4-6 hours:  If on nasal continuous positive airway pressure, respiratory therapy assess nares and determine need for appliance change or resting period. 10/12/2022 1315 by hKadar Alfaro RN  Outcome: Progressing  Note: No signs of skin breakdown. Scattered bruising and abrasions. Wound to posterior scalp. Treated with polysporin. Problem: Pain  Goal: Verbalizes/displays adequate comfort level or baseline comfort level  10/12/2022 1315 by Khadar Alfaro RN  Outcome: Progressing  Flowsheets (Taken 10/12/2022 2844)  Verbalizes/displays adequate comfort level or baseline comfort level:   Encourage patient to monitor pain and request assistance   Assess pain using appropriate pain scale   Administer analgesics based on type and severity of pain and evaluate response   Implement non-pharmacological measures as appropriate and evaluate response  Note: Headaches improving with Fioricet. Currently 6/10 pain which patient states is tolerable.

## 2022-10-12 NOTE — PROGRESS NOTES
Occupational Therapy  Facility/Department: [facility] ACUTE REHAB UNIT  Rehabilitation Occupational Therapy Daily Treatment Note    Date: 10/12/22  Patient Name: Yumiko Forrest       Room: 3112/3112-01  MRN: [id number]  Account: [de-identified]   : 1990  (27 y.o.) Gender: female                    Past Medical History:  has no past medical history on file. Past Surgical History:   has no past surgical history on file. Restrictions  Restrictions/Precautions: Fall Risk;Seizure  Other position/activity restrictions: up with assistance, patient may shower    Subjective  Subjective: Pt semi supine in bed upon arrival, pleasant and agreeable to OT session. Restrictions/Precautions: Fall Risk;Seizure             Objective     Cognition  Arousal/Alertness: Appropriate responses to stimuli  Following Commands: Follows all commands without difficulty  Attention Span: Attends with cues to redirect  Memory: Appears intact  Safety Judgement: Decreased awareness of need for assistance;Decreased awareness of need for safety  Problem Solving: Assistance required to implement solutions;Assistance required to correct errors made  Insights: Decreased awareness of deficits  Initiation: Does not require cues  Sequencing: Does not require cues  Orientation  Overall Orientation Status: Within Normal Limits         ADL  Grooming/Oral Hygiene  Assistance Level: Independent  Skilled Clinical Factors: hand hygiene and oral hygiene in stance at sink  Putting [address] Level:  Independent  Skilled Clinical Factors: pt donned shoes seated EOB  Toileting  Assistance Level: Supervision  Skilled Clinical Factors: pt continent of bowel req + time to have BM, perihygiene seated i'ly, LB clothing mgmt on/off hips in stance with spvn  Toilet Transfers  Technique:  (ambulatory without AD)  Equipment: Standard toilet  Assistance Level: Supervision  Tub/Shower Transfers  Type: Tub  Transfer To: Tub/Shower in standing position  Additional Factors: Verbal cues;Cues for hand placement  Assistance Level: Supervision  Skilled Clinical Factors: pt provided step by step VCs and demo for lateral step in/out approach to tub with BUE supported against wall as pt does not have a GB; pt demo with spvn assist and no overt LOB; pt reporting she does not want to use a TTB or shower chair and if necessary she will lay into tub; howerver pt unwilling to attempt this transfer, pt was encouraged not to take a bath laying in tub for first week or so to ensure safety and to make sure spvn is provided due to likely difficulty getting in/out of tub from laying position, pt verb understanding          Functional Mobility  Device:  (no AD)  Activity: To/From bathroom; To/From therapy gym  Assistance Level: Stand by assist  Supine to Sit  Assistance Level: Modified independent  Skilled Clinical Factors: HOB elevated  Sit to Stand  Assistance Level: Supervision  Stand to Sit  Assistance Level: Supervision   OT Exercises  Exercise Treatment: Pt completed the following BUE therex using green theraband in order to increase strength and activity tolerance for improved functional endurance to perform IADLs: x 10 sh flexion, x 10 sh h abd, x 12 elbow flexion, x 10 sh diagonals up, x 10 overhead triceps press req rest breaks between each set  Dynamic Standing Balance Exercises: In order to improve self righting reactions to challenge dynamic standing balance pt completed the following standing activities: 1) holding blue sensory ball pt was tasked with gross motor movements to form letters of alphabet with added dual tasking cognitive component to name an item from a provided catefory, however due to HA, pt unwilling to complete the second component, \"It just hurts my head to think\", pt completed letters A-Z in stance with SBA and no overt LOB     Assessment  Assessment  Assessment: Pt demo spvn for toileting and toilet transfers and oral hygiene i'ly.  Pt activity tolerance continues to be significantly limited by pain and dizziness requiring + time and frequent rest breaks with all activities. Pt provided with HEP to improve endurance and strength upon d/c as pt tends to state laying in bed is the only reprieve she gets at times. Pt eager to return home but reports feeling anxious and overwhelmed with the idea of being main caregiver for her son. Extensive education provided on importance of prioritization, delegating tasks and accepting help. Cont per OT POC. Activity Tolerance: Patient limited by fatigue;Patient limited by pain; Patient limited by endurance  Discharge Recommendations: Outpatient OT;24 hour supervision or assist  OT Equipment Recommendations  Equipment Needed: No  Other: pt declined  Safety Devices  Safety Devices in place: Yes  Type of devices: Left in chair;Call light within reach; Chair alarm in place    Patient Education  Education  Education Given To: Patient  Education Provided: Role of Therapy;Plan of Care;Transfer Training;ADL Function; Safety;Cognition;IADL Function;Home Exercise Program;Energy Conservation  Education Provided Comments: extensive education provided on adaptations to home environment to set up for success due to constant bouts of dizziness and pain, discussed having  assist with setting up \"stations\" in home in areas pt will be spending the most time to avoid unnecessary trips which may trigger pain or disrupt equilibrium; education provided on prioritization and becoming more comfortable delegating tasks and accepting help as well as importance of self-monitoring symptoms when at hospital visiting son as environment will likely be over-stimulating and trigger symptoms worsening; pt educated on UE HEP, purpose to complete after d/c for improved activity tolerance, pt also provided with handout to improve carryover  Education Method: Printed Information/Hand-outs; Verbal  Barriers to Learning: Paresh Outcome: Verbalized understanding;Continued education needed;Demonstrated understanding    Plan  Occupational Therapy Plan  Times Per Week: 5x/wk x60 min  Current Treatment Recommendations: Strengthening;Balance training;Functional mobility training; Endurance training;Neuromuscular re-education;Self-Care / ADL; Home management training; Safety education & training;Patient/Caregiver education & training;Equipment evaluation, education, & procurement    Goals  Patient Goals   Patient goals : improve independence with ADLs  Short Term Goals  Short Term Goal 1: Patient will complete LB dressing with MOD I  Short Term Goal 2: Patient will complete bathing with MOD I  Short Term Goal 3: Patient will complete UB dressing at independent level  Short Term Goal 4: Patient will complete functional transfers, including commode transfer and functional transfers, with MOD I  Short Term Goal 5: Patient will complete simple kitchen task with SPVN-goal met 10/11    AM-PAC Score               Therapy Time   Individual Concurrent Group Co-treatment   Time In 0830         Time Out 0930         Minutes 60         Timed Code Treatment Minutes: [address] [facility],

## 2022-10-12 NOTE — BH NOTE
I attempted to see patient later this afternoon, 10/11/2022, around 5:35 pm but pt was asleep in bed and  and another male were at her bedside. Informed them that I could have my colleague, Elaine Resendiz[facility] come to see pt tomorrow 10/12/2022, to complete the psychiaty consult.  reported that would be fine.      [address] [facility], APRN, [facility]  10/11/2022

## 2022-10-13 LAB
GLUCOSE BLD-MCNC: 110 MG/DL (ref 70–99)
GLUCOSE BLD-MCNC: 112 MG/DL (ref 70–99)
GLUCOSE BLD-MCNC: 164 MG/DL (ref 70–99)
GLUCOSE BLD-MCNC: 94 MG/DL (ref 70–99)
PERFORMED ON: ABNORMAL
PERFORMED ON: NORMAL

## 2022-10-13 PROCEDURE — 6360000002 HC RX W HCPCS: Performed by: PHYSICAL MEDICINE & REHABILITATION

## 2022-10-13 PROCEDURE — 97130 THER IVNTJ EA ADDL 15 MIN: CPT

## 2022-10-13 PROCEDURE — 97530 THERAPEUTIC ACTIVITIES: CPT

## 2022-10-13 PROCEDURE — 99232 SBSQ HOSP IP/OBS MODERATE 35: CPT | Performed by: PHYSICAL MEDICINE & REHABILITATION

## 2022-10-13 PROCEDURE — 97110 THERAPEUTIC EXERCISES: CPT | Performed by: PHYSICAL THERAPIST

## 2022-10-13 PROCEDURE — 97129 THER IVNTJ 1ST 15 MIN: CPT

## 2022-10-13 PROCEDURE — 6370000000 HC RX 637 (ALT 250 FOR IP): Performed by: NURSE PRACTITIONER

## 2022-10-13 PROCEDURE — 97530 THERAPEUTIC ACTIVITIES: CPT | Performed by: PHYSICAL THERAPIST

## 2022-10-13 PROCEDURE — 97535 SELF CARE MNGMENT TRAINING: CPT

## 2022-10-13 PROCEDURE — 97116 GAIT TRAINING THERAPY: CPT | Performed by: PHYSICAL THERAPIST

## 2022-10-13 PROCEDURE — 6370000000 HC RX 637 (ALT 250 FOR IP): Performed by: PHYSICAL MEDICINE & REHABILITATION

## 2022-10-13 PROCEDURE — 6360000002 HC RX W HCPCS: Performed by: NEUROLOGICAL SURGERY

## 2022-10-13 PROCEDURE — 1280000000 HC REHAB R&B

## 2022-10-13 RX ADMIN — OXYCODONE HYDROCHLORIDE 7.5 MG: 15 TABLET ORAL at 21:59

## 2022-10-13 RX ADMIN — OXYCODONE HYDROCHLORIDE 7.5 MG: 15 TABLET ORAL at 16:40

## 2022-10-13 RX ADMIN — BISACODYL 5 MG: 5 TABLET, COATED ORAL at 08:08

## 2022-10-13 RX ADMIN — Medication 5 MG: at 22:00

## 2022-10-13 RX ADMIN — ENOXAPARIN SODIUM 120 MG: 150 INJECTION SUBCUTANEOUS at 22:00

## 2022-10-13 RX ADMIN — SERTRALINE HYDROCHLORIDE 25 MG: 25 TABLET ORAL at 08:09

## 2022-10-13 RX ADMIN — OXYCODONE HYDROCHLORIDE 7.5 MG: 15 TABLET ORAL at 10:09

## 2022-10-13 RX ADMIN — ACETAMINOPHEN 650 MG: 325 TABLET ORAL at 21:58

## 2022-10-13 RX ADMIN — BACITRACIN ZINC AND POLYMYXIN B SULFATE: 500; 10000 OINTMENT TOPICAL at 08:43

## 2022-10-13 RX ADMIN — ZOLPIDEM TARTRATE 5 MG: 5 TABLET ORAL at 22:00

## 2022-10-13 RX ADMIN — PANTOPRAZOLE SODIUM 40 MG: 40 TABLET, DELAYED RELEASE ORAL at 06:46

## 2022-10-13 RX ADMIN — BACITRACIN ZINC AND POLYMYXIN B SULFATE: 500; 10000 OINTMENT TOPICAL at 22:00

## 2022-10-13 RX ADMIN — ENOXAPARIN SODIUM 120 MG: 150 INJECTION SUBCUTANEOUS at 08:08

## 2022-10-13 RX ADMIN — Medication 400 MG: at 08:09

## 2022-10-13 RX ADMIN — DEXAMETHASONE 2 MG: 4 TABLET ORAL at 08:09

## 2022-10-13 RX ADMIN — METFORMIN HYDROCHLORIDE 500 MG: 500 TABLET ORAL at 16:41

## 2022-10-13 RX ADMIN — METFORMIN HYDROCHLORIDE 500 MG: 500 TABLET ORAL at 08:09

## 2022-10-13 RX ADMIN — BUTALBITAL, ACETAMINOPHEN, AND CAFFEINE 1 TABLET: 50; 325; 40 TABLET ORAL at 08:42

## 2022-10-13 ASSESSMENT — PAIN DESCRIPTION - ORIENTATION
ORIENTATION: RIGHT;LEFT;ANTERIOR
ORIENTATION: OTHER (COMMENT)
ORIENTATION: RIGHT;LEFT;ANTERIOR;POSTERIOR

## 2022-10-13 ASSESSMENT — PAIN DESCRIPTION - DESCRIPTORS
DESCRIPTORS: ACHING
DESCRIPTORS: ACHING;SORE
DESCRIPTORS: ACHING

## 2022-10-13 ASSESSMENT — PAIN DESCRIPTION - LOCATION
LOCATION: HEAD
LOCATION: HEAD
LOCATION: HEAD;OTHER (COMMENT)
LOCATION: HEAD
LOCATION: HEAD

## 2022-10-13 ASSESSMENT — PAIN DESCRIPTION - FREQUENCY: FREQUENCY: CONTINUOUS

## 2022-10-13 ASSESSMENT — PAIN DESCRIPTION - ONSET: ONSET: ON-GOING

## 2022-10-13 ASSESSMENT — PAIN SCALES - GENERAL
PAINLEVEL_OUTOF10: 0
PAINLEVEL_OUTOF10: 8
PAINLEVEL_OUTOF10: 9
PAINLEVEL_OUTOF10: 8
PAINLEVEL_OUTOF10: 9
PAINLEVEL_OUTOF10: 9

## 2022-10-13 ASSESSMENT — PAIN - FUNCTIONAL ASSESSMENT
PAIN_FUNCTIONAL_ASSESSMENT: ACTIVITIES ARE NOT PREVENTED
PAIN_FUNCTIONAL_ASSESSMENT: ACTIVITIES ARE NOT PREVENTED

## 2022-10-13 NOTE — PROGRESS NOTES
Occupational Therapy  Facility/Department: [address] - [facility] UNIT  Rehabilitation Occupational Therapy Daily Treatment Note / Discharge Summary    Date: 10/13/22  Patient Name: Romana Sine       Room: 3112/3112-01  MRN: [id number]  Account: [de-identified]   : 1990  (27 y.o.) Gender: female                    Past Medical History:  has no past medical history on file. Past Surgical History:   has no past surgical history on file. Restrictions  Restrictions/Precautions: Fall Risk;Seizure  Other position/activity restrictions: up with assistance, patient may shower    Subjective  Subjective: Pt met supine in bed requesting to go to the bathroom. Pt agreeable to OT session. Restrictions/Precautions: Fall Risk;Seizure             Objective               ADL  Feeding  Assistance Level: Independent  Skilled Clinical Factors: finishing breakfast upon entry  Grooming/Oral Hygiene  Assistance Level: Independent  Skilled Clinical Factors: oral and hand hygiene in stance at sink pt reporting fatigue and dizziness upon completion and returning to chair. Pt stopping during task with hands on sink for rest breaks  Upper Extremity Bathing  Assistance Level: Supervision  Skilled Clinical Factors: Seated on TTB with increased time for all tasks due to pain and fatigue. Pt with increased time for working on tangled hair in shower  Lower Extremity Bathing  Assistance Level: Contact guard assist;Increased time to complete;Verbal cues  Skilled Clinical Factors: Pt taking seated rest breaks requiring increased time for completion of tasks. Pt with increasing fatigue and headache pain during LE washing and dressing tasks  Upper Extremity Dressing  Assistance Level: Independent  Skilled Clinical Factors: Pt gathering clothing items and donning seated in recliner chair.  Prolong rest break taken after donning shirt and bra  Lower Extremity Dressing  Supervision  Skilled Clinical Factors: Pt doffing underwear and pants seated on TTB. Donning pants and underwear requiring significant increased time with multiple rest breaks due to headache pain and fatigue  Putting On/Taking Off Footwear  Assistance Level: Independent  Skilled Clinical Factors: Pt donning shoes seated on TTB  Toileting  Assistance Level: Supervision  Skilled Clinical Factors: increased time for bowel movement x 2 pt completing hygiene seated I'ly then requiring Supervision for clothing mangment in stance due to dizziness. Pt leaning forward onto GB while seated on toilet due to head pain  Toilet Transfers  Equipment: Standard toilet  Assistance Level: Supervision  Skilled Clinical Factors: ambulatory with no assistive device but holding GB upon aproach and during transfer  Tub/Shower Transfers  Type: Shower  Transfer To: Tub transfer bench  Additional Factors: Verbal cues;Cues for hand placement  Assistance Level: Supervision  Skilled Clinical Factors: Pt able to complete transfer with out use of GB for simulation of home environment. Functional Mobility  Activity: To/From bathroom  Assistance Level: Supervision;Stand by assist  Skilled Clinical Factors: Pt requiring Supervision initially then progressing to SBA due to increased pain and fatigue after showering tasks. Two trips made into bathroom. Second trip requiring increased assist SBA  Bed Mobility  Overall Assistance Level: Independent  Supine to Sit  Assistance Level: Independent  Scooting  Assistance Level: Independent  Skilled Clinical Factors: HOB flat with out use of GB  Transfers  Surface: Standard toilet; To chair with arms;From bed;From chair with arms  Sit to Stand  Assistance Level: Supervision  Skilled Clinical Factors: Pt transfering from EOB to toilet to recliner chair to TTB to recliner to toilet to recliner.   Stand to Sit  Assistance Level: Supervision  Bed To/From Chair  Assistance Level: Supervision         Assessment  Assessment  Assessment: Pt significantly limited by pain and fatigue during session requiring increased time for all tasks with frequent rest breaks. Pt meeting 2/5 goals during therapy sessions. Education provided on safe home set up and encouraged to ask for assistance. Bathing and LE dressing completed with Supervision. Pt expressing concern about pain and dizziness while caring for infant son. Discharge from therapy and plan is for return home with 24hr assist and Outpt OT. Activity Tolerance: Patient limited by fatigue;Patient limited by pain; Patient limited by endurance  Discharge Recommendations: Outpatient OT;24 hour supervision or assist  OT Equipment Recommendations  Equipment Needed: No  Safety Devices  Safety Devices in place: Yes  Type of devices: Left in chair;Nurse notified;Call light within reach; Chair alarm in place;Gait belt    Patient Education  Education  Education Given To: Patient  Education Provided: Role of Therapy;Plan of Care;Cognition; Safety;ADL Function;Transfer Training  Education Provided Comments: Pt expressing concern about dizziness while caring for baby. Pt educated on safe home set up and need for asking for assistance. Setting up safe stations in home with all needs in place. Pt verb understanding.   Barriers to Learning: Cognition  Education Outcome: Verbalized understanding;Continued education needed;Demonstrated understanding    Plan       Goals  Patient Goals   Patient goals : improve independence with ADLs  Short Term Goals  Time Frame for Short Term Goals: 2 weeks  Short Term Goal 1: Patient will complete LB dressing with MOD I - not met 10/13  Short Term Goal 2: Patient will complete bathing with MOD I - not met 10/13  Short Term Goal 3: Patient will complete UB dressing at independent level - goal met 10/13  Short Term Goal 4: Patient will complete functional transfers, including commode transfer and functional transfers, with MOD I - not met 10/13  Short Term Goal 5: Patient will complete simple kitchen task with SPVN-goal met 10/11 Therapy Time   Individual Concurrent Group Co-treatment   Time In 0830         Time Out 0954         Minutes 84         Timed Code Treatment Minutes: 81 Gene Cota

## 2022-10-13 NOTE — PROGRESS NOTES
Assessment completed, medications given. Fall precautions are in place, call light and bedside table are within reach. Patient is aware to call for assistance to transfer, encourage patient to call for pain interventions 5/10. Encouraged patient to continue to pump/ dump for milk production. Fiorecet given for HA pain.

## 2022-10-13 NOTE — PROGRESS NOTES
Department of Physical Medicine & Rehabilitation  Progress Note    Patient Identification:  Tara Araya  [id number]  : 1990  Admit date: 10/7/2022    Chief Complaint: SAH (subarachnoid hemorrhage) ([address]    Subjective:   Headaches continued yesterday afternoon and today. She was unable to perform in some therapy yesterday due to pain. Today she will attempt therapy. Wants to DC home tomorrow. ROS: No f/c, n/v, cp     Objective:  Patient Vitals for the past 24 hrs:   BP Temp Temp src Pulse Resp SpO2   10/13/22 1015 103/72 97.8 °F (36.6 °C) Oral (!) 106 18 --   10/13/22 1009 -- -- -- -- 18 --   10/13/22 0745 118/75 97.8 °F (36.6 °C) Oral 80 18 95 %   10/12/22 2045 135/87 97.6 °F (36.4 °C) Oral 84 18 96 %       Const: Alert. No distress, pleasant. HEENT: Normocephalic, atraumatic. Normal sclera/conjunctiva. MMM. CV: Regular rate and rhythm. Resp: No respiratory distress. Lungs CTAB. Abd: Soft, nontender, nondistended, NABS+   Ext: No edema. Neuro: Alert, oriented, appropriately interactive. Psych: Cooperative, appropriate mood and affect    Laboratory data: Available via EMR. Last 24 hour lab  Recent Results (from the past 24 hour(s))   POCT Glucose    Collection Time: 10/12/22  5:09 PM   Result Value Ref Range    POC Glucose 93 70 - 99 mg/dl    Performed on ACCU-CHEK    POCT Glucose    Collection Time: 10/12/22  8:13 PM   Result Value Ref Range    POC Glucose 104 (H) 70 - 99 mg/dl    Performed on ACCU-CHEK    POCT Glucose    Collection Time: 10/13/22  7:53 AM   Result Value Ref Range    POC Glucose 94 70 - 99 mg/dl    Performed on ACCU-CHEK        Therapy progress:  PT  Position Activity Restriction  Other position/activity restrictions: up with assistance, patient may shower  Objective     Sit to Stand: Contact guard assistance (VC for hand placement .  Pt unsteady with transition req CGA and use of the RW to be stable)  Stand to Sit: Contact guard assistance (Decreased eccentric control with increased urgency when pt reported that she was \"going to be sick\")  Bed to Chair: Contact guard assistance (Used the RW and performed stepping with walker to get to the chair)  Device: [address]: Contact guard assistance, Minimal assistance  Distance: 20'  OT  PT Equipment Recommendations  Other: Ongoing assessment  Toilet Transfers Comments: unable to assess d/t time constraints  Assessment        SLP          Body mass index is 48.35 kg/m². Assessment and Plan:  #Multifocal hemorrhagic contusions   Scattered SAH   Superior enplate deformities of T11 & T12,indeterminat   -No neurosurgical intervention indicated at this time   -Heparin gtt for DVST -HCT when therapeutic   -CTA outpatient  -PT/OT/SLP  -Pain management: PRN Tylenol, low dose Oxycodone      #L mastoid temporal bone fracture with middle ear and mastoid effusion  #Diastases of the left occipital mastoid suture.  Fracture involving the left sigmoid plate with pneumocephalus.   - CT max/face 10/3 with L occipital mastoid suture diastases and L sigmoid plate fracture with pneumocephalus   - Outpatient audiogram in 1-2 weeks with ENT follow up                                   # Hx of Diabetes (Metformin at home)    -No acute issues   - SSI  - metformin low dose                    # Obesity   -counseling                       #DVST    partially occlusive thrombus within left sigmoid and transverse sinuses   -Lovenox     # sleep disturbance  - Ambien 5mg qhs   - DC at discharge       Rehab Progress: Improving  Anticipated Dispo: home  Services/DME: [place]  ELOS: 10/15      Ivet Jeffers D.O. M.P.H  PM&R  10/13/2022  12:06 PM

## 2022-10-13 NOTE — PROGRESS NOTES
Physical Therapy  Facility/Department: [facility] - [facility] UNIT  Rehabilitation Physical Therapy Treatment Note/ Discharge Summary    NAME: Bryan Iglesias  : 1990 (34 y.o.)  MRN: [id number]  CODE STATUS: Full Code    Date of Service: 10/13/22       Restrictions:  Restrictions/Precautions: Fall Risk;Seizure  Position Activity Restriction  Other position/activity restrictions: up with assistance, patient may shower     SUBJECTIVE  Subjective  Subjective: Pt sitting up in recliner when PT arrived. Pt agreeable to therapy. Pain: Pt reports that she feels better than she did yesterday but still has HA. Did not rate pain        Post Treatment Pain Screening         OBJECTIVE  Cognition  Arousal/Alertness: Appropriate responses to stimuli  Following Commands: Follows all commands without difficulty  Attention Span: Attends with cues to redirect  Memory: Appears intact  Safety Judgement: Good awareness of safety precautions  Problem Solving: Assistance required to implement solutions;Assistance required to correct errors made  Insights: Fully aware of deficits  Initiation: Does not require cues  Sequencing: Does not require cues  Cognition Comment: Pt appears more aware of her deficits than previously noted. Pt reports the need for continued therapy for balance and ststes that she will not be initially \"carrying\" her baby anywhere until she feels more safe. Orientation  Overall Orientation Status: Within Normal Limits    Functional Mobility  Bed Mobility  Overall Assistance Level: Independent  Additional Factors:  (Bed positioned flat to simulate home situation. Pt instructed not to use the bedrails since there would not be any available at home.)  Bridging  Assistance Level: Independent  Roll Left  Assistance Level: Independent  Roll Right  Assistance Level: Independent  Sit to Supine  Assistance Level: Independent  Supine to Sit  Assistance Level: Independent  Scooting  Assistance Level:  Independent (This includes laterally and cephalo<>caudal)  Balance  Sitting Balance: Independent  Standing Balance: Independent (Mostly Ind AEB with walking but pt demo \"weaving\" when pt becomes fatigued. Pt compenastes by walking close to a wall or use a carroll railing)  Transfers  Surface: To bed; To chair with arms; Wheelchair; To mat;From bed;From chair with arms;From mat  Device:  (No AD)  Sit to Stand  Assistance Level: Independent  Stand to Sit  Assistance Level: Independent  Bed To/From Chair  Assistance Level: Independent  Stand Pivot  Assistance Level: Independent  Car Transfer  Assistance Level: Independent (Used car simulator and adjusted it to the height of pt's vehicle)  Floor Transfer  Assistance Level: Independent (Note, PT demo the technique and then pt was able to teach back indly)      Environmental Mobility  Ambulation  Surface: Level surface; Ramp  Device:  (None)  Distance: 350' x2 ( includes up and down a ramp x 70' in each direction, 160', 60' x2  Assistance Level: Independent (As pt fatigued , pt appeared more unsteady)  Gait Deviations: Path deviations;Narrow base of support (Path deviations only with increased fatigue)  Skilled Clinical Factors: VC for controlled walking on level surfaces with CGA for changes in direction especially with turning corners on the R  Stairs  Stair Height: 6''  Device: Bilateral handrails (HR on the L when ascending.)  Number of Stairs: 18(4+14)  Additional Factors: Reciprocal going up;Reciprocal going down  Assistance Level: Modified independent  Curb  Number of Curbs: 1  PT instructed pt with the following ex performed in supine to BLES:  1. SLRs   2. Bridges with hold of \"3\" ( UES across chest)  3. Alternating hip/knee flex/ext x 20 reps  Leta 10 reps of each unless otherwise stated.  Pt was not feeling well and unable to complete the chest passes and 1/2 bridges but reports that she will cont w/them as part of her HEP                  ASSESSMENT/PROGRESS TOWARDS GOALS Assessment  Assessment: Pt cont to present with  HA pain and varying sensations ( one minute very cold and then the next minute felling extremely warm. .Pt has progressed well and is overall Ind with mobility. However, pt fatigues quickly and balance becomes compromised. Pt has achieved all of the previously set goals. Pt is still  below baseline and would benefit from continued therapy to maximze potential and increase functional mobility towards Ind . Activity Tolerance: Patient limited by fatigue;Patient limited by pain; Patient limited by endurance  Discharge Recommendations: Home with assist PRN;Home with Home health PT;Continue to assess pending progress  PT Equipment Recommendations  Other: Ongoing assessment    Goals  Patient Goals   Patient Goals : \"To get out of here \"  Short Term Goals  Time Frame for Short Term Goals: 2 weeks  Short Term Goal 1: Ind with bed mobility. Goal achieved  Short Term Goal 2: Ind with transf excluding floor transf. Goal achieved  Short Term Goal 3: Ind with amb on level surfaces distances > 250' at a time. Goal achieved  Short Term Goal 4: MI with amb up and down 12 steps ( used as an endurance goal)Goal achieved    PLAN OF CARE/SAFETY  Physcial Therapy Plan  Additional Comments: Tentatively sched for d/c tomoorow w/ . OP PT to cont to maximize pt's potential.  Safety Devices  Type of Devices: All fall risk precautions in place;Call light within reach; Patient at risk for falls; Bed alarm in place; Left in bed    EDUCATION  Education  Education Given To: Patient  Education Provided: Safety; Mobility Training;Transfer Training;Energy Conservation;Cognition; Fall Prevention Strategies; Home Exercise Program  Education Provided Comments: PT educated on the importance of increasing activity.  Pt also educated on the importance of self monitoring and requesting rests when fatigued  Education Method: Verbal  Barriers to Learning: None  Education Outcome: Verbalized understanding;Continued education needed        Therapy Time   Individual Concurrent Group Co-treatment   Time In 1245         Time Out 1345         Minutes [address] [place] [address], 10/13/22 at 3:23 PM

## 2022-10-13 NOTE — PLAN OF CARE
Problem: Discharge Planning  Goal: Discharge to home or other facility with appropriate resources  Outcome: Adequate for Discharge  Flowsheets (Taken 10/13/2022 0142)  Discharge to home or other facility with appropriate resources:   Identify barriers to discharge with patient and caregiver   Arrange for needed discharge resources and transportation as appropriate   Identify discharge learning needs (meds, wound care, etc)     Problem: Skin/Tissue Integrity  Goal: Absence of new skin breakdown  Description: 1.   Monitor for areas of redness and/or skin breakdown  10/13/2022 0138 by Gilbert Hernandes RN  Outcome: Progressing     Problem: Pain  Goal: Verbalizes/displays adequate comfort level or baseline comfort level  10/13/2022 0138 by Gilbert Hernandes RN  Flowsheets (Taken 10/13/2022 0138)  Verbalizes/displays adequate comfort level or baseline comfort level:   Encourage patient to monitor pain and request assistance   Assess pain using appropriate pain scale   Administer analgesics based on type and severity of pain and evaluate response   Implement non-pharmacological measures as appropriate and evaluate response     Problem: ABCDS Injury Assessment  Goal: Absence of physical injury  10/13/2022 0138 by Gilbert Hernandes RN  Outcome: Progressing  Flowsheets (Taken 10/13/2022 0138)  Absence of Physical Injury: Implement safety measures based on patient assessment

## 2022-10-13 NOTE — PROGRESS NOTES
Pt in bathroom with PCA. Pt returned to bed. Alert & oriented x 4. Complains of a headache. VSS. Assessment completed. Nighttime medication given including Tylenol for pain given per pt request. Pt tolerated well. Polysporin applied to pt's laceration to R posterior head. Pt refuses to let anyone wash her hair, per pt it hurts. Reminded pt to call for assistance with any needs. Call light within reach. Safety measures in place.

## 2022-10-13 NOTE — PROGRESS NOTES
ACUTE REHAB UNIT  SPEECH/LANGUAGE PATHOLOGY      [x] Daily  [] Weekly Care Conference Note  [x] Discharge    Lissy Fernandez  ROV:[id number]  Rehab Dx/Hx: SAH (subarachnoid hemorrhage) ([facility] [address] [I60.9]    Precautions: [] Aspiration  [x] Fall risk  [] Sternal  [x] Seizure [] Hip  [] Weight Bearing [] Other  ST Dx: [] Aphasia  [] Dysarthria  [] Apraxia   [] Oropharyngeal dysphagia [x] Cognitive Impairment  [] Other:   Date of Admit: 10/7/2022  Room #: 4304/6357-95  Date: 10/13/2022          Current Diet Order:ADULT DIET; Regular      Lives With: Spouse (10 mo son, who is currently still hospitalized at [facility].)  Homemaking Responsibilities: Yes  Education: bachelor's degree  Bill Paying/Finance Responsibility: Primary (online)  Health Care Management: Primary (out of the bottle)  Type of Occupation: previously worked full time in [employer], 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 Leisure & Hobbies: reading, walking, spending time with Juanetta Cyp, playing with animals (3 cats, dog)     Vision  Vision: Within Functional Limits  Hearing  Hearing: Within functional limits  Barriers toward progress: Pain, appropriately tearful     Date: 10/13/2022       Tx session 1 Tx session 2 Discharge Summary   Total Timed Code Min 30 30    Total Treatment Minutes 30 30    Individual Treatment Minutes 30 30    Group Treatment Minutes 0 0    Co-Treat Minutes 0 0    Brief Exception: N/A N/A    Pain 9/10 headache-RN present and provided medications Dull headache 6/10+back pain    Pain Intervention: [x] RN notified  [] Repositioned  [] Intervention offered and patient declined  [] N/A  [] Other: [] RN notified  [] Repositioned  [x] Intervention offered and patient declined  [] N/A  [] Other:    Subjective:     Pt seated upright in chair, had just finished with OT and expressed pain, however able to proceed with SLP session at this time. Pt seated upright in bed agreeable to session at this time.      Objective / Goals: Pt will complete graded tasks with adequate executive function skills with 90% accuracy independently. Targeted via barrier task design generation: 90% accuracy with adequate details provided. Min cues from SLP to determine specific size/location of designs, however pt answered all appropriately. Not directly targeted this session. GOAL MET   Pt will initiate responses <5 seconds across 3 sessions. All adequate and timely responses noted. All adequate and timely responses noted. GOAL MET   Pt will demonstrate planning and organization during graded tasks with 90% accuracy independently. Provided with trach/sterile suctioning supplies (as pt expressed in previous session how she completes this at baseline with her infant son). Pt demonstrated adequate verbal sequencing prior to use of actual supplies. 90% accuracy in regards to demonstration of sterile suction. Pt with mild impulsivity and cued to take time as if this was her sons trach and be aware of sterile vs non-sterile hands when in use. Pt expressed comprehension. Provided with leisure activity targeting planning/thought organization skills: completed with 90% accuracy, min cues to utilize increased descriptors and put sequence of information together cohesively. GOAL MET   Pt will tolerate ongoing cognitive-linguistic assessment as clinically indicated. As noted above. Pt's pain appears to continue negatively impacting participation and accuracy at times. Not directly targeted. GOAL MET   Other areas targeted:      Education:   Educated re: rationale for tasks completed, carry over of at home tasks to be completed, importance of stimuli breaks for reducing headaches etc.  Educated re: strategies to implement at home for reducing noxious stimuli as needed, continuation of targeting cognitive skills with assistance as needed. Discussed recommended assistance as needed with childcare/personal care as needed.  Pt demonstrated understanding and expressed  and parents will be present to assist.     Safety Devices: [x] Call light within reach  [x] Chair alarm activated and connected to nurse call light system  [] Bed alarm activated   [] Other: [x] Call light within reach  [x] Chair alarm activated and connected to nurse call light system  [] Bed alarm activated  [] Other:     Assessment: Mild executive dysfunction ongoing but improving. Pain continues to be a limiting factor at times, however adequate progress and participation this date. Plan: Discharge from SLP services at this time. Pt returning home tomorrow.        Interventions used this date:  [] Speech/Language Treatment  [] Instruction in HEP  [] Dysphagia Treatment [x] Cognitive Treatment   [] Other:    Discharge recommendations:  [] Home independently  [x] Home with assistance []  24 hour supervision  [] ECF [] Other  Continued Tx Upon Discharge: ? [x] Yes    [] No    [] TBD based on progress while on ARU     [] Vital Stim indicated     [] Other:   Estimated discharge date: October 15th, 2022      Electronically signed by:  Agustina Delgado M.A., [address] [place] [address]  Speech-Language Pathologist

## 2022-10-13 NOTE — PLAN OF CARE
Problem: Discharge Planning  Goal: Discharge to home or other facility with appropriate resources  10/13/2022 0855 by Rk Buck RN  Outcome: Progressing   Patient is planning discharge, education on medications and breast feeding on going, education on headache and pain management provided. Encourage non pharmacologic interventions.

## 2022-10-14 VITALS
OXYGEN SATURATION: 98 % | RESPIRATION RATE: 18 BRPM | TEMPERATURE: 97.7 F | HEART RATE: 97 BPM | HEIGHT: 63 IN | BODY MASS INDEX: 48.36 KG/M2 | SYSTOLIC BLOOD PRESSURE: 125 MMHG | WEIGHT: 272.93 LBS | DIASTOLIC BLOOD PRESSURE: 87 MMHG

## 2022-10-14 LAB
ANION GAP SERPL CALCULATED.3IONS-SCNC: 13 MMOL/L (ref 3–16)
BASOPHILS ABSOLUTE: 0.1 K/UL (ref 0–0.2)
BASOPHILS RELATIVE PERCENT: 0.5 %
BUN BLDV-MCNC: 17 MG/DL (ref 7–20)
CALCIUM SERPL-MCNC: 9.3 MG/DL (ref 8.3–10.6)
CHLORIDE BLD-SCNC: 104 MMOL/L (ref 99–110)
CO2: 23 MMOL/L (ref 21–32)
CREAT SERPL-MCNC: 0.6 MG/DL (ref 0.6–1.1)
EOSINOPHILS ABSOLUTE: 0.2 K/UL (ref 0–0.6)
EOSINOPHILS RELATIVE PERCENT: 1.6 %
GFR AFRICAN AMERICAN: >60
GFR NON-AFRICAN AMERICAN: >60
GLUCOSE BLD-MCNC: 105 MG/DL (ref 70–99)
GLUCOSE BLD-MCNC: 116 MG/DL (ref 70–99)
GLUCOSE BLD-MCNC: 139 MG/DL (ref 70–99)
HCT VFR BLD CALC: 37.7 % (ref 36–48)
HEMOGLOBIN: 12.3 G/DL (ref 12–16)
LYMPHOCYTES ABSOLUTE: 4.2 K/UL (ref 1–5.1)
LYMPHOCYTES RELATIVE PERCENT: 37.5 %
MCH RBC QN AUTO: 27.5 PG (ref 26–34)
MCHC RBC AUTO-ENTMCNC: 32.8 G/DL (ref 31–36)
MCV RBC AUTO: 83.9 FL (ref 80–100)
MONOCYTES ABSOLUTE: 0.6 K/UL (ref 0–1.3)
MONOCYTES RELATIVE PERCENT: 5.4 %
NEUTROPHILS ABSOLUTE: 6.1 K/UL (ref 1.7–7.7)
NEUTROPHILS RELATIVE PERCENT: 55 %
PDW BLD-RTO: 14.6 % (ref 12.4–15.4)
PERFORMED ON: ABNORMAL
PERFORMED ON: ABNORMAL
PLATELET # BLD: 246 K/UL (ref 135–450)
PMV BLD AUTO: 8 FL (ref 5–10.5)
POTASSIUM REFLEX MAGNESIUM: 3.9 MMOL/L (ref 3.5–5.1)
RBC # BLD: 4.49 M/UL (ref 4–5.2)
SODIUM BLD-SCNC: 140 MMOL/L (ref 136–145)
WBC # BLD: 11.1 K/UL (ref 4–11)

## 2022-10-14 PROCEDURE — 36415 COLL VENOUS BLD VENIPUNCTURE: CPT

## 2022-10-14 PROCEDURE — 6370000000 HC RX 637 (ALT 250 FOR IP): Performed by: PHYSICAL MEDICINE & REHABILITATION

## 2022-10-14 PROCEDURE — 6360000002 HC RX W HCPCS: Performed by: PHYSICAL MEDICINE & REHABILITATION

## 2022-10-14 PROCEDURE — 80048 BASIC METABOLIC PNL TOTAL CA: CPT

## 2022-10-14 PROCEDURE — 85025 COMPLETE CBC W/AUTO DIFF WBC: CPT

## 2022-10-14 PROCEDURE — 99239 HOSP IP/OBS DSCHRG MGMT >30: CPT | Performed by: PHYSICAL MEDICINE & REHABILITATION

## 2022-10-14 RX ORDER — POLYETHYLENE GLYCOL 3350 17 G/17G
17 POWDER, FOR SOLUTION ORAL DAILY PRN
Qty: 527 G | Refills: 1 | Status: SHIPPED | OUTPATIENT
Start: 2022-10-14 | End: 2022-11-13

## 2022-10-14 RX ORDER — ENOXAPARIN SODIUM 150 MG/ML
1 INJECTION SUBCUTANEOUS 2 TIMES DAILY
Qty: 100.8 ML | Refills: 0 | Status: SHIPPED | OUTPATIENT
Start: 2022-10-14 | End: 2022-12-16

## 2022-10-14 RX ORDER — BUTALBITAL, ACETAMINOPHEN AND CAFFEINE 50; 325; 40 MG/1; MG/1; MG/1
1 TABLET ORAL EVERY 4 HOURS PRN
Qty: 180 TABLET | Refills: 3 | Status: SHIPPED | OUTPATIENT
Start: 2022-10-14

## 2022-10-14 RX ORDER — ONDANSETRON 4 MG/1
4 TABLET, ORALLY DISINTEGRATING ORAL EVERY 8 HOURS PRN
Qty: 30 TABLET | Refills: 0 | Status: SHIPPED | OUTPATIENT
Start: 2022-10-14

## 2022-10-14 RX ORDER — LANOLIN ALCOHOL/MO/W.PET/CERES
400 CREAM (GRAM) TOPICAL DAILY
Qty: 30 TABLET | Refills: 0 | Status: SHIPPED | OUTPATIENT
Start: 2022-10-15

## 2022-10-14 RX ADMIN — OXYCODONE HYDROCHLORIDE 7.5 MG: 15 TABLET ORAL at 07:59

## 2022-10-14 RX ADMIN — OXYCODONE HYDROCHLORIDE 7.5 MG: 15 TABLET ORAL at 12:23

## 2022-10-14 RX ADMIN — Medication 400 MG: at 07:58

## 2022-10-14 RX ADMIN — ENOXAPARIN SODIUM 120 MG: 150 INJECTION SUBCUTANEOUS at 09:35

## 2022-10-14 RX ADMIN — BACITRACIN ZINC AND POLYMYXIN B SULFATE: 500; 10000 OINTMENT TOPICAL at 09:37

## 2022-10-14 RX ADMIN — SERTRALINE HYDROCHLORIDE 25 MG: 25 TABLET ORAL at 08:38

## 2022-10-14 RX ADMIN — PANTOPRAZOLE SODIUM 40 MG: 40 TABLET, DELAYED RELEASE ORAL at 07:36

## 2022-10-14 RX ADMIN — METFORMIN HYDROCHLORIDE 500 MG: 500 TABLET ORAL at 07:58

## 2022-10-14 ASSESSMENT — PAIN DESCRIPTION - ORIENTATION: ORIENTATION: RIGHT;LEFT

## 2022-10-14 ASSESSMENT — PAIN DESCRIPTION - LOCATION
LOCATION: HEAD;OTHER (COMMENT)
LOCATION: HEAD;OTHER (COMMENT)

## 2022-10-14 ASSESSMENT — PAIN - FUNCTIONAL ASSESSMENT: PAIN_FUNCTIONAL_ASSESSMENT: PREVENTS OR INTERFERES SOME ACTIVE ACTIVITIES AND ADLS

## 2022-10-14 ASSESSMENT — PAIN DESCRIPTION - DESCRIPTORS
DESCRIPTORS: ACHING
DESCRIPTORS: ACHING;SHOOTING

## 2022-10-14 ASSESSMENT — PAIN SCALES - GENERAL
PAINLEVEL_OUTOF10: 8
PAINLEVEL_OUTOF10: 7

## 2022-10-14 NOTE — CARE COORDINATION
Case Management Assessment            Discharge Note                    Date / Time of Note: 10/14/2022 10:13 AM                  Discharge Note Completed by: Gerhardt Angst, LSW    Patient Name: Santiago Robles   YOB: 1990  Diagnosis: SAH (subarachnoid hemorrhage) ([facility] [address] [I60.9]   Date / Time: 10/7/2022 12:52 PM    Current PCP: PROVIDER Bryant Laura MD  Clinic patient: No    Hospitalization in the last 30 days: No    Advance Directives:  Code Status: Full Code  [place] DNR form completed and on chart: No    Financial:  Payor: Case Days / Plan: [employer] / Product Type: *No Product type* /      Pharmacy:    [facility] [id number] [address] [place] [address] [facility] [address]   Phone: [phone] Fax: [phone]      Assistance purchasing medications?:    Assistance provided by Case Management: None at this time    Does patient want to participate in local refill/ meds to beds program?:      Meds To Beds General Rules:  1. Can ONLY be done Monday- Friday between 8:30am-5pm  2. Prescription(s) must be in pharmacy by 3pm to be filled same day  3. Copy of patient's insurance/ prescription drug card and patient face sheet must be sent along with the prescription(s)  4. Cost of Rx cannot be added to hospital bill. If financial assistance is needed, please contact unit  or ;  or  CANNOT provide pharmacy voucher for patients co-pays  5.  Patients can then  the prescription on their way out of the hospital at discharge, or pharmacy can deliver to the bedside if staff is available. (payment due at time of pick-up or delivery - cash, check, or card accepted)     Able to afford home medications/ co-pay costs: Yes    ADLS:  Current PT AM-PAC Score:   /24  Current OT AM-PAC Score:   /24      DISCHARGE Disposition:   Home w/spouse + outpt therapy    LOC at discharge: Not Applicable  GUERA Completed: Yes    Notification completed in HENS/PAS?:  Not Applicable    IMM Completed:   Not Indicated    Transportation:  Transportation PLAN for discharge: family   Mode of Transport: Private [place] ordered at discharge: No    Durable Medical Equipment:  DME Provider:   Equipment obtained during hospitalization: none    Home Oxygen and Respiratory Equipment:  Oxygen needed at discharge?: No    Dialysis:  Dialysis patient: No    Referrals made at [facility] for outpatient continued care: Outpatient PT/OT    Additional CM Notes:   MARGA met with Pt and her mother at bedside this morning. Therapy recommending outpt PT/OT and no DME needed. Outpt therapy RX completed. Pt and mother in agreement and stated they prefer a location close to where they live in [facility]. Pt's mother stated she prefers [facility] in [facility] since Pt's sister went there for therapy and was pleased with facility. MARGA called [employer] at [phone] and they stated to fax a facesheet and RX to [phone]-  faxed info. MARGA provided RX to Pt to take to first appt. No other DC needs. Emotional support provided. The Plan for Transition of Care is related to the following treatment goals of SAH (subarachnoid hemorrhage) ([facility] [address] [I60.9]    The Patient and/or patient representative Irma Sanabria and her family were provided with a choice of provider and agrees with the discharge plan Yes    Freedom of choice list was provided with basic dialogue that supports the patient's individualized plan of care/goals and shares the quality data associated with the providers.  Yes    Care Transitions patient: no    Crhistiane Hair   Case Management Department  Ph: [phone]

## 2022-10-14 NOTE — PLAN OF CARE
Problem: Skin/Tissue Integrity  Goal: Absence of new skin breakdown  Description: 1.   Monitor for areas of redness and/or skin breakdown  Outcome: Progressing     Problem: Pain  Goal: Verbalizes/displays adequate comfort level or baseline comfort level  Outcome: Not Progressing  Flowsheets (Taken 10/14/2022 0025)  Verbalizes/displays adequate comfort level or baseline comfort level:   Encourage patient to monitor pain and request assistance   Assess pain using appropriate pain scale   Administer analgesics based on type and severity of pain and evaluate response   Implement non-pharmacological measures as appropriate and evaluate response     Problem: ABCDS Injury Assessment  Goal: Absence of physical injury  Outcome: Progressing  Flowsheets (Taken 10/14/2022 0025)  Absence of Physical Injury: Implement safety measures based on patient assessment

## 2022-10-14 NOTE — PROGRESS NOTES
Physical Therapy  Facility/Department: [facility] ACUTE REHAB UNIT  Rehabilitation Physical Therapy Treatment Note    NAME: Juan Mcmahan  : 1990 (34 y.o.)  MRN: [id number]  CODE STATUS: Full Code  PT arrived to pt's room at sched treatment time. Pt  supine in bed holding her head. Pt immediately reported that she was unable  to participate  secondary  to feeling  \"sick\" pt reports that she consistently gets noxious and then her HA gets worse after she eats. Pt reports that she is just \"too sick\"  to do anything. PT  notified nsg and reported pt's status. No  therapy  received on this date.    Variance minutes:60       Lisbeth Pollock PT, 10/12/22

## 2022-10-14 NOTE — PROGRESS NOTES
Pt in bed, eyes closed but easily aroused. Alert & oriented x 4. HR elevated, other VSS. Assessment completed. Pt complaining of pain \"all over. \" Nighttime medications given including Oxycodone for pain. Laceration to right posterior head with scant amount of blood. Advised pt not to scratch the incision. Assisted pt to the bathroom with standby  assistance. Pt voided and returned to the bed. Reminded pt to call for assistance with any additional needs. Call light within reach. Safety measures in place.

## 2022-10-14 NOTE — PROGRESS NOTES
Patient alert and oriented X4, far more awake and interactive than she was upon admission. Continues to c/o pain in head, but also whole body. PRN pain medications given as charted. Patient to be discharged today. Medications reviewed, patient verbalized understanding using teachback method. Follow up appointments verified and included in discharge paperwork. Patient anxious to leave and see son at [facility]. Refused pumping this morning stating that she would just pump after getting to children's. Verified with  [facility] that patient is discharging on 61 lovenox shots. Patients verbalized comfort giving herself shots. Discharge paperwork signed. Waiting for father to pick her up.

## 2022-10-14 NOTE — DISCHARGE SUMMARY
Physical Medicine & Rehabilitation  Discharge Summary     Patient Identification:  Jigna Saenz  : 1990  Admit date: 10/7/2022  Discharge date:  10/14/22  Attending provider: Piter Ordoñez DO        Primary care provider: Jose Nguyen MD     Discharge Diagnoses:   Patient Active Problem List   Diagnosis    SAH (subarachnoid hemorrhage) ([facility] [address]    Depression       History of Present Illness/Acute Hospital Course:  Pt is a 34year old female with a PMH of MDD, ALMAS, and ADHD who presents to the ED via EMS after sustaining a 10 foot fall from her attic to her garage concrete floor. Patient had hit her head and there was blood present on the concrete floor. She was initially a GCS of 15 per EMS but deteriorated to GCS 10 en route and started vomiting. She was given hypertonic saline. In the ED, patient was confused but able to answer questions. Presented as a GCS 15. CT of the head revealed multifocal hemorrhagic contusions within the inferomedial frontal lobes, greater on the left. 2.  Scattered subarachnoid hemorrhage. 3.  Nondisplaced left mastoid temporal bone fracture with middle ear and mastoid effusion. Pt did not require neurosurgical intervention during hospital stay. Pt is now medically stable and ready for discharge. Inpatient Rehabilitation Course:   Jigna Saenz is a 34 y.o. female admitted to inpatient rehabilitation on 10/7/2022 with SAH (subarachnoid hemorrhage) ([facility] [address] The patient participated in an aggressive multidisciplinary inpatient rehabilitation program involving 3 hours of therapy per day, at least 5 days per week.      Impairments: Decreased functional mobility     Medical Management:  #Multifocal hemorrhagic contusions   Scattered SAH   Superior enplate deformities of T11 & T12,indeterminat   -No neurosurgical intervention indicated at this time   -Heparin gtt for DVST -HCT when therapeutic   -CTA outpatient  -PT/OT/SLP  -Pain management: PRN Tylenol, low dose Oxycodone      #L mastoid temporal bone fracture with middle ear and mastoid effusion  #Diastases of the left occipital mastoid suture. Fracture involving the left sigmoid plate with pneumocephalus.   - CT max/face 10/3 with L occipital mastoid suture diastases and L sigmoid plate fracture with pneumocephalus   - Outpatient audiogram in 1-2 weeks with ENT follow up                                   # Hx of Diabetes (Metformin at home)    -No acute issues   - SSI  - metformin low dose                    # Obesity   -counseling                       #DVST    partially occlusive thrombus within left sigmoid and transverse sinuses   -Lovenox - 60 days      # sleep disturbance  - Ambien 5mg qhs   - DC at discharge     Discharge Exam:  Constitutional: Alert, WDWN, Pleasant, no distress  Head: Normocephalic, atruamatic, MMM  Eyes: Conjunctiva noninjected, no icterus, no drainage  Pulm: CTA bilat. Respirations non-labored. CV: No murmurs noted. RRR. Abd: Soft, nontender. NABS+  Ext: No edema, no varicosities  Neuro: Alert, fully oriented, appropriate   MSK: No joint abnormalities noted       Discharge Functional Status:    Physical therapy:  Bed Mobility: Scooting: Supervision (unable to david caudal <>cephalo scooting 2/2 the intensity of HA and LBP)  Transfers: Sit to Stand: Contact guard assistance (VC for hand placement .  Pt unsteady with transition req CGA and use of the RW to be stable)  Stand to Sit: Contact guard assistance (Decreased eccentric control with increased urgency when pt reported that she was \"going to be sick\")  Bed to Chair: Contact guard assistance (Used the RW and performed stepping with walker to get to the chair), WB Status: No WB status restrictions noted in chart  Ambulation  Surface: Level tile  Device: Rolling Walker  Assistance: Contact guard assistance, Minimal assistance  Quality of Gait: Asymmetrical step length, difficulty with managing the walker,  Gait Deviations: Staggers  Distance: 21'  More Ambulation?: No,    Mobility:  , PT Equipment Recommendations  Other: Ongoing assessment, Assessment: Pt is a 34year old female with a PMH of MDD, ALMAS, and ADHD who presents to the ED via EMS after sustaining a 10 foot fall from her attic to her garage concrete floor. Patient had hit her head and there was blood present on the concrete. In the ED, patient was confused but able to answer questions. 1. inferomedial frontal lobes, greater on the left. 2.  Scattered subarachnoid hemorrhage. 3.  Nondisplaced left mastoid temporal bone fracture with middle ear and mastoid effusion. Pt did not require neurosurgical intervention during hospital stay. Pt presents on this date with severe back pain and HA limiting her abilities to participate fully. 2/2 to the LBP which increased with standing, PT paused during the session and positioned pt back in bed in R sidelying position with CP applied to LB x 20 minutes. PT returned and attempted to assess pt's status further. Pt did get up and walk but was still in severe pain. PT educated pt on the importance of being up in a vertical position and instructed her to be sure that she was sitting up for her meals with additional time added in sitting for each meal. Pt agreed. Pt also c/o dizziness and multiple times throughout session thought that she was going to be \"sick\" No emesis occurred during therapy session. Pt is well below baseline and would benefit from continued therapy to maximzie potential and increase functional mobility towards Ind to allow for a safer d/c to home. Occupational therapy:  ,  , Assessment: 33 yo patient admit for TBI. Initial evaluation limited by significant headache, pain and overstimulation preventing patient from tolerating BADL tasks well this date; unable to complete a full shower and requiring significant assistance for all tasks completed. Anticipate patient to progress well as pain control improves.   Would benefit from ARU OT services in order to increase independence with ADLs, activity tolerance, strength, and safety awareness/cognition for IADL tasks including careproviding for an infant. Speech therapy:         Significant Diagnostics:   Lab Results   Component Value Date    CREATININE 0.6 10/14/2022    BUN 17 10/14/2022     10/14/2022    K 3.9 10/14/2022     10/14/2022    CO2 23 10/14/2022       Lab Results   Component Value Date    WBC 11.1 (H) 10/14/2022    HGB 12.3 10/14/2022    HCT 37.7 10/14/2022    MCV 83.9 10/14/2022     10/14/2022       Disposition:  home    Services:PT/OT  DME: walker     Discharge Condition: Stable    Follow-up:  See after visit summary from hospitalization    Discharge Medications:     Medication List        START taking these medications      butalbital-acetaminophen-caffeine -40 MG per tablet  Commonly known as: FIORICET, ESGIC  Take 1 tablet by mouth every 4 hours as needed for Headaches     enoxaparin 120 MG/0.8ML injection  Commonly known as: LOVENOX  Inject 0.8 mLs into the skin 2 times daily Rx amount for 60 days     magnesium oxide 400 (240 Mg) MG tablet  Commonly known as: MAG-OX  Take 1 tablet by mouth daily  Start taking on: October 15, 2022     metFORMIN 500 MG tablet  Commonly known as: GLUCOPHAGE  Take 1 tablet by mouth 2 times daily (with meals)     ondansetron 4 MG disintegrating tablet  Commonly known as: ZOFRAN-ODT  Take 1 tablet by mouth every 8 hours as needed for Nausea or Vomiting     oxyCODONE HCl 7.5 MG Tabs  Take 7.5 mg by mouth every 4 hours as needed for Pain for up to 3 days.      polyethylene glycol 17 g packet  Commonly known as: GLYCOLAX  Take 17 g by mouth daily as needed for Constipation            CONTINUE taking these medications      loratadine 10 MG tablet  Commonly known as: CLARITIN     sertraline 25 MG tablet  Commonly known as: ZOLOFT            STOP taking these medications      losartan 25 MG tablet  Commonly known as: COZAAR Where to Get Your Medications        These medications were sent to [facility] [id number]  [address] [place] [address]      Phone: [phone]   butalbital-acetaminophen-caffeine -40 MG per tablet  enoxaparin 120 MG/0.8ML injection  magnesium oxide 400 (240 Mg) MG tablet  metFORMIN 500 MG tablet  ondansetron 4 MG disintegrating tablet  oxyCODONE HCl 7.5 MG Tabs  polyethylene glycol 17 g packet           I spent over 35 minutes on this discharge encounter between counseling, coordination of care, and medication reconciliation. To comply with [facility] cristal [id number]:   Discharge order placed in advance to facilitate patients discharge needs.       Augustus Josue, DO

## 2022-10-14 NOTE — PROGRESS NOTES
[facility] Discharge Assessment    Transportation  \"Has lack of transportation kept you from medical appointments, meetings, work, or from getting things needed for daily living? \"Check all that apply:  [] A.  Yes, it has kept me from medical appointments or from getting my medications  [] B.  Yes, it has kept me from non-medical meetings, appointments, work, or from getting things that I need  [x] C.  No  [] X. Patient unable to respond    Provision of Current Reconciled Medication List to Subsequent Provider at Discharge  [] No, current reconciled medication list not provided to the subsequent provider. [x] Yes, current reconciled medication list provided to the subsequent provider. (**Select route of transmission below**)   [] [employer]   [] [employer]  [] Verbal (e.g. in person, telephone, video conferencing)  [x] Paper-based (e.g. fax, copies, printouts)   [] Other Methods (e.g. texting, email, CDs)    Provision of Current Reconciled Medication List to Patient at Discharge  [] No, current reconciled medication list not provided to the patient, family and/or caregiver. [x] Yes, current reconciled medication list provided to the patient, family and/or caregiver.  (**Select route of transmission below**)   [] Via Electronic Health Record (e.g., electronic access to patient portal)   [] [employer]  [] Verbal (e.g. in person, telephone, video conferencing)  [x] Paper-based (e.g. fax, copies, printouts)   [] Other Methods (e.g. texting, email, CDs)    Health Literacy  \"How often do you need to have someone help you when you read instructions, pamphlets, or other written material from your doctor or pharmacy? \"  [x]  0. Never  []  1. Rarely  []  2. Sometimes  []  3. Often  []  4. Always  []  8.   Patient unable to respond    BIMS - **Must be completed in the flowsheet at discharge prior to proceeding with Delirium Assessment**  [x] BIMS completed in flowsheet at discharge    Signs and Symptoms of Delirium  A. Acute Onset Mental Status Change - Is there evidence of an acute change in mental status from the patient's baseline? [x] 0. No  [] 1. Yes    B. Inattention - Did the patient have difficulty focusing attention, for example being easily distractible or having difficulty keeping track of what was being said? [x]  0. Behavior not present  []  1. Behavior continuously present, does not fluctuate  []  2. Behavior present, fluctuates (comes and goes, changes in severity)    C. Disorganized thinking - Was the patient's thinking disorganized or incoherent (rambling or irrelevant conversation, unclear or illogical flow of ideas, or unpredictable switching from subject to subject)? [x]  0. Behavior not present  []  1. Behavior continuously present, does not fluctuate  []  2. Behavior present, fluctuates (comes and goes, changes in severity)    D. Altered level of consciousness - Did the patient have altered level of consciousness as indicated by any of the following criteria? Vigilant - startled easily to any sound or touch  Lethargic - repeatedly dozed off while being asked questions, but responded to voice or touch  Stuporous - very difficulty to arouse and keep aroused for the interview  Comatose - could not be aroused  [x]  0. Behavior not present  []  1. Behavior continuously present, does not fluctuate  []  2. Behavior present, fluctuates (comes and goes, changes in severity)    Mood    \"Over the last 2 weeks, have you been bothered by any of the following problems?\" 1. Symptom Presence    0 = No  1 = Yes  9 = No Response 2.  Symptom Frequency    0 = Never or 1 day  1 = 2-6 days (several days)  2 = 7-11 days (half or more of the days)  3 = 12-14 days (nearly every day)  **Leave blank if 'No Reponse'**      Enter scores in boxes    Column 1 Column 2   Little interest or pleasure in doing things   1 1   Feeling down, depressed, or hopeless   1 1   **If either A or B in column 2 is coded 2 or 3, CONTINUE asking the questions below. If not, END the interview. **     Trouble falling or staying asleep, or sleeping too much       Feeling tired or having little energy       Poor appetite or overeating       Feeling bad about yourself - or that you are a failure or have let yourself or your family down       Trouble concentrating on things, such as reading the newspaper or watching television       Moving or speaking so slowly that other people could have noticed. Or the opposite- being so fidgety or restless that you have been moving around a lot more than usual.       Thoughts that you would be better off dead, or of hurting yourself in some way. Total Severity: Add scores for all frequency responses in column 2 (possible score 0-27, or enter 99 if unable to complete (if symptom frequency (column 2) is blank for 3 or more items). Social Isolation  \"How often do you feel lonely or isolated from those around you? \"  [] 0. Never  [] 1. Rarely  [x] 2. Sometimes  [] 3. Often  [] 4. Always  [] 7. Patient declines to respond  [] 8. Patient unable to respond    Pain Effect on Sleep  \"Over the past 5 days, how much of the time has pain made it hard for you to sleep at night? \"  []  0. Does not apply - I have not had any pain or hurting in the past 5 days  []  1. Rarely or not at all  []  2. Occasionally  [x]  3. Frequently  []  4. Almost constantly  []  8. Unable to answer    **If the patient answers \"0. Does not apply\" to this question, skip the next two \"Pain Effect. Della Johnson \" questions**    Pain Interference with Therapy Activities  \"Over the past 5 days, how often have you limited your participation in rehabilitation therapy sessions due to pain? \"  []  0. Does not apply - I have not received rehabilitation therapy in the past 5 days  [x]  1. Rarely or not at all  []  2. Occasionally  []  3. Frequently  []  4. Almost constantly  []  8. Unable to answer    Pain Interference with Day-to-Day Activities: \"Over the past 5 days, how often have you limited your day-to-day activities (excluding rehabilitation therapy session)? \"  [x]  1. Rarely or not at all  []  2. Occasionally  []  3. Frequently  []  4. Almost constantly  []  8. Unable to answer    Nutritional Approaches  Last 7 Days - Check all of the following nutritional approaches that were received within the last 7 days:  []  A. Parenteral/IV feeding  []  B. Feeding tube (e.g., nasogastric or abdominal (PEG))  []  C. Mechanically altered diet - requires change in texture of food or liquids (e.g., pureed food, thickened liquids)  []  D. Therapeutic diet (e.g., low salt, diabetic, low cholesterol)  [x]  Z. None of the above    At time of Discharge - Check all of the following nutritional approaches that were being received at discharge:  []  A. Parenteral/IV feeding (including IV fluids if needed for hydration, but not as part of dialysis/chemo)  []  B. Feeding tube (e.g., nasogastric or abdominal (PEG))  []  C. Mechanically altered diet - requires change in texture of food or liquids (e.g., pureed food, thickened liquids)  []  D. Therapeutic diet (e.g., low salt, diabetic, low cholesterol  [x]  Z. None of the above    High Risk Drug Classes:  Use and Indication    Is taking: Check if the pt is taking any medications by pharmacological classification, not how it is used, in the following classes  Indication noted:  If column 1 is checked, check if there is an indication noted for all meds in the drug class Is taking  (check all that apply) Indication noted (check all that apply)   Antipsychotic [] []   Anticoagulant [] []   Antibiotic [] []   Opioid [x] [x]   Antiplatelet [] []   Hypoglycemic (including insulin) [x] [x]   None of the above []     Special Treatments, Procedures, and Programs    Check all of the following treatments, procedures, and programs that apply at discharge. At Discharge (check all that apply)   Cancer Treatments   A1. Chemotherapy []           A2. IV []           A3. Oral []           A10. Other []   B1. Radiation []   Respiratory Therapies   C1. Oxygen Therapy []           C2. Continuous (continuously for at least 14 hours per day) []           C3. Intermittent []           C4. High-concentration []   D1. Suctioning (Does not include oral suctioning) []           D2. Scheduled []           D3. As needed []   E1. Tracheostomy Care []   F1. Invasive Mechanical Ventilator (ventilator or respirator) []   G1. Non-invasive Mechanical Ventilator []           G2. BiPAP []           G3. CPAP []   Other   H1. IV Medications (Do not include sub Q pumps, flushes, Dextrose 50% or lactated ringers) []           H2. Vasoactive medications []           H3. Antibiotics []           H4. Anticoagulation []           H10. Other []   I1. Transfusions []   J1. Dialysis []           J2. Hemodialysis []           J3. Peritoneal dialysis []   O1. IV access (including a catheter in a vein) []           O2. Peripheral []           O3. Midline []           O4.  Central (PICC, tunneled, port) []      None of the above (select if no Cancer, Respiratory, or Other boxes are checked) [x]

## 2022-10-14 NOTE — DISCHARGE INSTR - COC
Continuity of Care Form    Patient Name: Vitor Weathers   :  1990  MRN:  [id number]    Admit date:  10/7/2022  Discharge date:  ***    Code Status Order: Full Code   Advance Directives:     Admitting Physician:  Nitin Vaughn DO  PCP: PROVIDER Sydnie Tay MD    Discharging Nurse: [facility] Unit/Room#: 6798/1819-95  Discharging Unit Phone Number: ***    Emergency Contact:   Extended Emergency Contact Information  Primary Emergency Contact: [address] [facility] Phone: [phone]  Mobile Phone: [phone]  Relation: Spouse  Secondary Emergency Contact: [address] Phone: [phone]  Mobile Phone: [phone]  Relation: Parent    Past Surgical History:  No past surgical history on file. Immunization History: There is no immunization history on file for this patient. Active Problems:  Patient Active Problem List   Diagnosis Code    [facility] I60.9    Depression F32. A       Isolation/Infection:   Isolation            No Isolation          Patient Infection Status       None to display            Nurse Assessment:  Last Vital Signs: /87   Pulse 97   Temp 97.7 °F (36.5 °C) (Oral)   Resp 18   Ht 5' 3\" (1.6 m) Comment: 5 foot 3  Wt 272 lb 14.9 oz (123.8 kg)   SpO2 98%   BMI 48.35 kg/m²     Last documented pain score (0-10 scale): Pain Level: 8  Last Weight:   Wt Readings from Last 1 Encounters:   10/07/22 272 lb 14.9 oz (123.8 kg)     Mental Status:  {IP PT MENTAL STATUS:49160}    IV Access:  { [facility] IV ACCESS:628359010}    Nursing Mobility/ADLs:  Walking   {[facility] DME AJHV:987891945}  Transfer  {[facility] DME KCEH:867844502}  Bathing  {[facility] DME VBFI:384713214}  Dressing  {[facility] DME CZAT:148227588}  Toileting  {[facility] DME RYCP:954927904}  Feeding  {[facility] DME UBPJ:447258687}  Med Admin  {[facility] DME TXSR:200801758}  Med Delivery   { [facility] MED Delivery:214529759}    Wound Care Documentation and Therapy:  Wound 10/07/22 Head Right;Upper;Posterior (Active) Wound Image   10/10/22 1552   Wound Etiology Traumatic 10/13/22 2145   Dressing Status Other (Comment) 10/12/22 2045   Wound Cleansed Not Cleansed 10/13/22 2145   Dressing/Treatment Pharmaceutical agent (see MAR) 10/13/22 2145   Dressing Change Due 10/10/22 10/10/22 1552   Wound Length (cm) 0.3 cm 10/10/22 1552   Wound Width (cm) 0.3 cm 10/10/22 1552   Wound Depth (cm) 0.1 cm 10/10/22 1552   Wound Surface Area (cm^2) 0.09 cm^2 10/10/22 1552   Wound Volume (cm^3) 0.009 cm^3 10/10/22 1552   Wound Assessment Dry;Pink/red 10/13/22 2145   Drainage Amount Scant 10/13/22 2145   Drainage Description Sanguinous 10/13/22 2145   Odor None 10/13/22 2145   Chery-wound Assessment Intact 10/13/22 0846   Margins Attached edges; Defined edges 10/13/22 0846   Wound Thickness Description not for Pressure Injury Partial thickness 10/13/22 0846   Number of days: 7        Elimination:  Continence: Bowel: {YES / MP:63207}  Bladder: {YES / YX:99492}  Urinary Catheter: {Urinary Catheter:570881058}   Colostomy/Ileostomy/Ileal Conduit: {YES / BH:72320}       Date of Last BM: ***    Intake/Output Summary (Last 24 hours) at 10/14/2022 1023  Last data filed at 10/14/2022 0835  Gross per 24 hour   Intake 480 ml   Output --   Net 480 ml     I/O last 3 completed shifts:   In: 18 [P.O.:480]  Out: -     Safety Concerns:     [address] Safety Concerns:595985548}    Impairments/Disabilities:      [address] Impairments/Disabilities:921417008}    Nutrition Therapy:  Current Nutrition Therapy:   [address] Diet List:308786446}    Routes of Feeding: {[facility] DME Other Feedings:212290824}  Liquids: {Slp liquid thickness:47680}  Daily Fluid Restriction: {[facility] DME Yes amt example:160870131}  Last Modified Barium Swallow with Video (Video Swallowing Test): {Done Not Done IJOX:647979403}    Treatments at the Time of Hospital Discharge:   Respiratory Treatments: ***  Oxygen Therapy:  {Therapy; copd oxygen:89292}  Ventilator:    {[facility] CC Vent NDXK:803868398}    Rehab Therapies: {THERAPEUTIC INTERVENTION:3357125895}  Weight Bearing Status/Restrictions: {[facility] Weight Bearin}  Other Medical Equipment (for information only, NOT a DME order):  {EQUIPMENT:958398015}  Other Treatments: ***    Patient's personal belongings (please select all that are sent with patient):  {[facility] DME Belongings:563827376}    RN SIGNATURE:  {Esignature:428470709}    CASE MANAGEMENT/SOCIAL WORK SECTION    Inpatient Status Date:     Readmission Risk Assessment Score:  Readmission Risk              Risk of Unplanned Readmission:  8           Discharging to Facility/ Agency   Name: Outpatient PT[facility]OT at [facility]-  Address:  Phone: [phone]    Dialysis Facility (if applicable)   Name:  Address:  Dialysis Schedule:  Phone:  Fax:    / signature: Electronically signed by CHAPO Sanders on 10/14/22 at 10:24 AM EDT    PHYSICIAN SECTION    Prognosis: {Prognosis:2131256746}    Condition at Discharge: [address] [place] [address] Patient Condition:576924845}    Rehab Potential (if transferring to Rehab): {Prognosis:8102357090}    Recommended Labs or Other Treatments After Discharge: ***    Physician Certification: I certify the above information and transfer of Mirian Domingo  is necessary for the continuing treatment of the diagnosis listed and that she requires {Admit to Appropriate Level of Care:18317} for {GREATER/LESS:924414527} 30 days.      Update Admission H&P: {[facility] DME Changes in CLN:940016388}    PHYSICIAN SIGNATURE:  {Esignature:892483939}

## 2022-10-15 RX ORDER — OXYCODONE HYDROCHLORIDE 5 MG/1
7.5 TABLET ORAL EVERY 6 HOURS PRN
Qty: 50 TABLET | Refills: 0 | Status: SHIPPED | OUTPATIENT
Start: 2022-10-15 | End: 2022-10-22

## 2023-10-05 NOTE — PROGRESS NOTES
Carol stopped into clinic today because she has not heard back from 3 previous [facility] [employer] in [place] messages sent this week. She has missed work all week due to n/v, sweating and loose stools and crying easily since increasing dose of Sertraline to 50 mg. She says she is slightly better today, able to eat a small amount and she was able to get through our whole nurse visit without crying. We talked about giving the increase dose another week to see how she feels since her symptoms are starting to get better. She is willing to do this and she will take hydroxyzine as needed for anxiety.     She is requesting a note sent to her [employer] that she missed work 10/2/23-10/5/23 due to illness.     MARIA ALEJANDRA paperwork also in your folder as discussed at last appointment   Shift assessment complete. BP elevated. Dr Delores Cao informed. A/Ox4 but has a flat effect. Dime size bleeding laceration found on patients posterior scalp. Added to 4 eye skin assessment. Fall precautions in place. Complaints of headache. Tylenol given. Showering with therapy. Call light in reach. Will continue to monitor.      Vitals:    10/08/22 0816   BP: (!) 143/109   Pulse: 80   Resp: 18   Temp: 98.2 °F (36.8 °C)   SpO2: 97%